# Patient Record
Sex: MALE | Race: OTHER | NOT HISPANIC OR LATINO | URBAN - METROPOLITAN AREA
[De-identification: names, ages, dates, MRNs, and addresses within clinical notes are randomized per-mention and may not be internally consistent; named-entity substitution may affect disease eponyms.]

---
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
## 2022-12-09 PROBLEM — Z00.00 ENCOUNTER FOR PREVENTIVE HEALTH EXAMINATION: Status: ACTIVE | Noted: 2022-12-09

## 2022-12-22 ENCOUNTER — TRANSCRIPTION ENCOUNTER (OUTPATIENT)
Age: 22
End: 2022-12-22

## 2022-12-22 ENCOUNTER — APPOINTMENT (OUTPATIENT)
Dept: OTOLARYNGOLOGY | Facility: CLINIC | Age: 22
End: 2022-12-22

## 2022-12-22 PROCEDURE — 92610 EVALUATE SWALLOWING FUNCTION: CPT | Mod: GN

## 2022-12-22 PROCEDURE — 92522 EVALUATE SPEECH PRODUCTION: CPT | Mod: GN

## 2023-01-03 ENCOUNTER — APPOINTMENT (OUTPATIENT)
Dept: OTOLARYNGOLOGY | Facility: CLINIC | Age: 23
End: 2023-01-03

## 2023-05-09 NOTE — REASON FOR VISIT
[FreeTextEntry1] : EVALUATION OF SPEECH & CHEWING FUNCTION  \par \par Reason for Referral: Mel Shultz is a 22year old male who was referred to this Center by his physician for a preoperative evaluation of speech and chewing function. 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  \par \par Presenting Problem: Mr. Shultz arrived to today’s evaluation accompanied by his mother who assisted in providing information relevant to the patient’s overall health, swallow and communicative function. In regard to his swallow function, the patient reports difficulty with mastication marked by a right sided preferential chewing pattern. He notes difficulty with oral clearance post swallow. He reports fatigue with prolonged mastication required for consumption of dense textures (I.e. bagels, steak). He denies pharyngeal s/s of dysphagia. He denies odynophagia and/or recent/recurrent pneumonia. In regard to his communicative function, the patient endorses “lisping” as well as fatigue with prolonged speaking. He denies language concerns.  \par \par Medical History: As per patient report, his medical history is unremarkable. \par \par Oral-Facial Examination: Oral-Facial examination revealed a facial symmetry (left to right) to be unremarkable. At rest position, the patient was observed to utilize an open mouth posture with a class III malocclusion noted. Intra-oral assessment revealed mildly reduced oral aperture and reduced, though functional labio-lingual strength and ROM. Orthodontic hardware was observed on upper and lower dentition. Upper airway breath sounds were clear at baseline. The oral cavity was adequately hydrated without any evidence of pooled secretions.  \par \par Speech: During conversational discourse, as well as during subtests of th GFTA the patient demonstrated articulation deficits atypical for his age marked by distortions of the following phonemes: /s/ /z/ /ch/ /sh/ and /d3/ in all positions of word, phrase ad conversational discourse levels. He is further noted to demonstrate atypical distortions of s-blend consonant clusters, again atypical for his age/gender. Diadochokinetic tasks were timely and complete. There was no evidence of verbal apraxia and/or dysfluency noted throughout assessment.  \par \par Chewing Function: The patient demonstrated moderate oral dysphagia with regular/solid textures marked by decreased mastication abilities resembling an open mouth, immature munching pattern with absent transition into a rotary chew pattern. Delayed oral preparation and anterior-posterior transport of dense bolus textures were further observed due to the abovementioned deficits with mastication. When the patient was prompted to utilize a closed mouth chewing posture as well as verbal cues to lateralize bolus, a mild improvement in overall oral management was observed. There was no evidence of anterior loss of dense textures observed during today’s assessment or reported by patient. The patient was presented with thin liquid textures at which time there was evidence of lingual thrust/protrusion observed. Pharyngeal stage of deglutition with regular/solid and liquid textures was noted to be within functional limits as marked by timely initiation of pharyngeal swallow trigger and timely and complete hyolaryngeal strength and excursion. There were no overt s/s of penetration/aspiration noted following any PO trials provided during today’s evaluation.  \par \par IMPRESSIONS:  \par 1) Dysarthria  \par 2) Mild Oral Dysphagia		 \par \par PROGNOSIS: Good with therapeutic intervention. \par \par RECOMMENDATIONS: \par 1)	The patient was advised to consume regular/solid diet with thin liquids.  \par 2)	The patient was provided with compensatory strategies designed to aid in chewing and articulatory functioning \par 3)	Consider short course of postoperative speech-swallow therapy, pending MD clearance.  \par 4)	The patient was advised to follow up with his primary care physician as directed. \par 5)	The patient was advised to follow up with his referring physician as directed. \par \par EDUCATION: The patient was provided with verbal and written material targeting compensatory strategies designed to aid in speech and chewing functioning. \par \par There were no further recommendations made at this time. Please contact this Center at [phone] if additional information is needed.

## 2023-10-12 ENCOUNTER — OUTPATIENT (OUTPATIENT)
Dept: OUTPATIENT SERVICES | Facility: HOSPITAL | Age: 23
LOS: 1 days | End: 2023-10-12
Payer: COMMERCIAL

## 2023-10-12 ENCOUNTER — APPOINTMENT (OUTPATIENT)
Dept: ENDOCRINOLOGY | Facility: CLINIC | Age: 23
End: 2023-10-12

## 2023-10-12 VITALS
RESPIRATION RATE: 16 BRPM | OXYGEN SATURATION: 100 % | DIASTOLIC BLOOD PRESSURE: 71 MMHG | WEIGHT: 169 LBS | HEIGHT: 71 IN | SYSTOLIC BLOOD PRESSURE: 122 MMHG | HEART RATE: 79 BPM | BODY MASS INDEX: 23.66 KG/M2

## 2023-10-12 DIAGNOSIS — Z00.00 ENCOUNTER FOR GENERAL ADULT MEDICAL EXAMINATION WITHOUT ABNORMAL FINDINGS: ICD-10-CM

## 2023-10-12 DIAGNOSIS — Z78.9 OTHER SPECIFIED HEALTH STATUS: ICD-10-CM

## 2023-10-12 DIAGNOSIS — N62 HYPERTROPHY OF BREAST: ICD-10-CM

## 2023-10-12 PROCEDURE — 99204 OFFICE O/P NEW MOD 45 MIN: CPT

## 2023-10-26 ENCOUNTER — APPOINTMENT (OUTPATIENT)
Dept: ENDOCRINOLOGY | Facility: CLINIC | Age: 23
End: 2023-10-26

## 2023-11-21 ENCOUNTER — OUTPATIENT (OUTPATIENT)
Dept: OUTPATIENT SERVICES | Facility: HOSPITAL | Age: 23
LOS: 1 days | End: 2023-11-21
Payer: COMMERCIAL

## 2023-11-21 DIAGNOSIS — N62 HYPERTROPHY OF BREAST: ICD-10-CM

## 2023-11-21 LAB — TSH SERPL-ACNC: 2.94 UIU/ML

## 2023-11-21 PROCEDURE — 84410 TESTOSTERONE BIOAVAILABLE: CPT

## 2023-11-21 PROCEDURE — 84153 ASSAY OF PSA TOTAL: CPT

## 2023-11-21 PROCEDURE — 84154 ASSAY OF PSA FREE: CPT

## 2023-11-21 PROCEDURE — 84402 ASSAY OF FREE TESTOSTERONE: CPT

## 2023-11-21 PROCEDURE — 80327 ANABOLIC STEROID 1 OR 2: CPT

## 2023-11-21 PROCEDURE — 84270 ASSAY OF SEX HORMONE GLOBUL: CPT

## 2023-11-21 PROCEDURE — 82627 DEHYDROEPIANDROSTERONE: CPT

## 2023-11-21 PROCEDURE — 84146 ASSAY OF PROLACTIN: CPT

## 2023-11-21 PROCEDURE — 36415 COLL VENOUS BLD VENIPUNCTURE: CPT

## 2023-11-21 PROCEDURE — 84140 ASSAY OF PREGNENOLONE: CPT

## 2023-11-21 PROCEDURE — 83001 ASSAY OF GONADOTROPIN (FSH): CPT

## 2023-11-21 PROCEDURE — 83002 ASSAY OF GONADOTROPIN (LH): CPT

## 2023-11-21 PROCEDURE — 84144 ASSAY OF PROGESTERONE: CPT

## 2023-11-21 PROCEDURE — 84443 ASSAY THYROID STIM HORMONE: CPT

## 2023-11-21 PROCEDURE — 84403 ASSAY OF TOTAL TESTOSTERONE: CPT

## 2023-11-21 PROCEDURE — 82533 TOTAL CORTISOL: CPT

## 2023-11-21 PROCEDURE — 82670 ASSAY OF TOTAL ESTRADIOL: CPT

## 2023-11-22 DIAGNOSIS — N62 HYPERTROPHY OF BREAST: ICD-10-CM

## 2023-11-23 LAB
CORTIS SERPL-MCNC: 6.6 UG/DL
DHEA-S SERPL-MCNC: 283 UG/DL
ESTRADIOL SERPL-MCNC: 31 PG/ML
ESTRADIOL SERPL-MCNC: 33 PG/ML
FSH SERPL-MCNC: 2.3 IU/L
LH SERPL-ACNC: 5.6 IU/L
PROGEST SERPL-MCNC: 0.4 NG/ML
PROLACTIN SERPL-MCNC: 11.4 NG/ML
PROLACTIN SERPL-MCNC: 11.6 NG/ML
PSA FREE FLD-MCNC: 47 %
PSA FREE SERPL-MCNC: 0.21 NG/ML
PSA SERPL-MCNC: 0.45 NG/ML
SHBG SERPL-SCNC: 29 NMOL/L

## 2023-11-25 LAB
TESTOST FREE SERPL-MCNC: 13.6 PG/ML
TESTOST SERPL-MCNC: 591 NG/DL
TESTOSTERONE FREE/WEAKLY BND: 181.3 NG/DL
TESTOSTERONE TOTAL S: 570 NG/DL
TESTOSTERONE, % FREE/WEAKLY BND: 31.8 %

## 2023-11-30 LAB
ANDROSTANOLONE SERPL-MCNC: 54 NG/DL
PREGNENOLONE-ESOTERIX: <10 NG/DL

## 2023-12-27 ENCOUNTER — EMERGENCY (EMERGENCY)
Facility: HOSPITAL | Age: 23
LOS: 0 days | Discharge: HOME | End: 2023-12-28
Attending: EMERGENCY MEDICINE
Payer: COMMERCIAL

## 2023-12-27 VITALS
TEMPERATURE: 97 F | OXYGEN SATURATION: 99 % | HEART RATE: 82 BPM | RESPIRATION RATE: 18 BRPM | DIASTOLIC BLOOD PRESSURE: 74 MMHG | SYSTOLIC BLOOD PRESSURE: 117 MMHG | HEIGHT: 71 IN | WEIGHT: 179.9 LBS

## 2023-12-27 DIAGNOSIS — R45.851 SUICIDAL IDEATIONS: ICD-10-CM

## 2023-12-27 DIAGNOSIS — F45.22 BODY DYSMORPHIC DISORDER: ICD-10-CM

## 2023-12-27 DIAGNOSIS — R00.1 BRADYCARDIA, UNSPECIFIED: ICD-10-CM

## 2023-12-27 DIAGNOSIS — F43.23 ADJUSTMENT DISORDER WITH MIXED ANXIETY AND DEPRESSED MOOD: ICD-10-CM

## 2023-12-27 DIAGNOSIS — Z56.0 UNEMPLOYMENT, UNSPECIFIED: ICD-10-CM

## 2023-12-27 DIAGNOSIS — Z20.822 CONTACT WITH AND (SUSPECTED) EXPOSURE TO COVID-19: ICD-10-CM

## 2023-12-27 LAB
ALBUMIN SERPL ELPH-MCNC: 4.4 G/DL — SIGNIFICANT CHANGE UP (ref 3.5–5.2)
ALBUMIN SERPL ELPH-MCNC: 4.4 G/DL — SIGNIFICANT CHANGE UP (ref 3.5–5.2)
ALP SERPL-CCNC: 79 U/L — SIGNIFICANT CHANGE UP (ref 30–115)
ALP SERPL-CCNC: 79 U/L — SIGNIFICANT CHANGE UP (ref 30–115)
ALT FLD-CCNC: 44 U/L — HIGH (ref 0–41)
ALT FLD-CCNC: 44 U/L — HIGH (ref 0–41)
ANION GAP SERPL CALC-SCNC: 10 MMOL/L — SIGNIFICANT CHANGE UP (ref 7–14)
ANION GAP SERPL CALC-SCNC: 10 MMOL/L — SIGNIFICANT CHANGE UP (ref 7–14)
APAP SERPL-MCNC: <5 UG/ML — LOW (ref 10–30)
APAP SERPL-MCNC: <5 UG/ML — LOW (ref 10–30)
AST SERPL-CCNC: 23 U/L — SIGNIFICANT CHANGE UP (ref 0–41)
AST SERPL-CCNC: 23 U/L — SIGNIFICANT CHANGE UP (ref 0–41)
BASOPHILS # BLD AUTO: 0.02 K/UL — SIGNIFICANT CHANGE UP (ref 0–0.2)
BASOPHILS # BLD AUTO: 0.02 K/UL — SIGNIFICANT CHANGE UP (ref 0–0.2)
BASOPHILS NFR BLD AUTO: 0.3 % — SIGNIFICANT CHANGE UP (ref 0–1)
BASOPHILS NFR BLD AUTO: 0.3 % — SIGNIFICANT CHANGE UP (ref 0–1)
BILIRUB SERPL-MCNC: 0.6 MG/DL — SIGNIFICANT CHANGE UP (ref 0.2–1.2)
BILIRUB SERPL-MCNC: 0.6 MG/DL — SIGNIFICANT CHANGE UP (ref 0.2–1.2)
BUN SERPL-MCNC: 15 MG/DL — SIGNIFICANT CHANGE UP (ref 10–20)
BUN SERPL-MCNC: 15 MG/DL — SIGNIFICANT CHANGE UP (ref 10–20)
CALCIUM SERPL-MCNC: 9.2 MG/DL — SIGNIFICANT CHANGE UP (ref 8.4–10.5)
CALCIUM SERPL-MCNC: 9.2 MG/DL — SIGNIFICANT CHANGE UP (ref 8.4–10.5)
CHLORIDE SERPL-SCNC: 102 MMOL/L — SIGNIFICANT CHANGE UP (ref 98–110)
CHLORIDE SERPL-SCNC: 102 MMOL/L — SIGNIFICANT CHANGE UP (ref 98–110)
CO2 SERPL-SCNC: 25 MMOL/L — SIGNIFICANT CHANGE UP (ref 17–32)
CO2 SERPL-SCNC: 25 MMOL/L — SIGNIFICANT CHANGE UP (ref 17–32)
CREAT SERPL-MCNC: 1 MG/DL — SIGNIFICANT CHANGE UP (ref 0.7–1.5)
CREAT SERPL-MCNC: 1 MG/DL — SIGNIFICANT CHANGE UP (ref 0.7–1.5)
EGFR: 110 ML/MIN/1.73M2 — SIGNIFICANT CHANGE UP
EGFR: 110 ML/MIN/1.73M2 — SIGNIFICANT CHANGE UP
EOSINOPHIL # BLD AUTO: 0.08 K/UL — SIGNIFICANT CHANGE UP (ref 0–0.7)
EOSINOPHIL # BLD AUTO: 0.08 K/UL — SIGNIFICANT CHANGE UP (ref 0–0.7)
EOSINOPHIL NFR BLD AUTO: 1.2 % — SIGNIFICANT CHANGE UP (ref 0–8)
EOSINOPHIL NFR BLD AUTO: 1.2 % — SIGNIFICANT CHANGE UP (ref 0–8)
ETHANOL SERPL-MCNC: <10 MG/DL — SIGNIFICANT CHANGE UP
ETHANOL SERPL-MCNC: <10 MG/DL — SIGNIFICANT CHANGE UP
FLUAV AG NPH QL: SIGNIFICANT CHANGE UP
FLUAV AG NPH QL: SIGNIFICANT CHANGE UP
FLUBV AG NPH QL: SIGNIFICANT CHANGE UP
FLUBV AG NPH QL: SIGNIFICANT CHANGE UP
GLUCOSE SERPL-MCNC: 92 MG/DL — SIGNIFICANT CHANGE UP (ref 70–99)
GLUCOSE SERPL-MCNC: 92 MG/DL — SIGNIFICANT CHANGE UP (ref 70–99)
HCT VFR BLD CALC: 48 % — SIGNIFICANT CHANGE UP (ref 42–52)
HCT VFR BLD CALC: 48 % — SIGNIFICANT CHANGE UP (ref 42–52)
HGB BLD-MCNC: 16.9 G/DL — SIGNIFICANT CHANGE UP (ref 14–18)
HGB BLD-MCNC: 16.9 G/DL — SIGNIFICANT CHANGE UP (ref 14–18)
IMM GRANULOCYTES NFR BLD AUTO: 0.3 % — SIGNIFICANT CHANGE UP (ref 0.1–0.3)
IMM GRANULOCYTES NFR BLD AUTO: 0.3 % — SIGNIFICANT CHANGE UP (ref 0.1–0.3)
LYMPHOCYTES # BLD AUTO: 2.27 K/UL — SIGNIFICANT CHANGE UP (ref 1.2–3.4)
LYMPHOCYTES # BLD AUTO: 2.27 K/UL — SIGNIFICANT CHANGE UP (ref 1.2–3.4)
LYMPHOCYTES # BLD AUTO: 33.6 % — SIGNIFICANT CHANGE UP (ref 20.5–51.1)
LYMPHOCYTES # BLD AUTO: 33.6 % — SIGNIFICANT CHANGE UP (ref 20.5–51.1)
MCHC RBC-ENTMCNC: 28.7 PG — SIGNIFICANT CHANGE UP (ref 27–31)
MCHC RBC-ENTMCNC: 28.7 PG — SIGNIFICANT CHANGE UP (ref 27–31)
MCHC RBC-ENTMCNC: 35.2 G/DL — SIGNIFICANT CHANGE UP (ref 32–37)
MCHC RBC-ENTMCNC: 35.2 G/DL — SIGNIFICANT CHANGE UP (ref 32–37)
MCV RBC AUTO: 81.6 FL — SIGNIFICANT CHANGE UP (ref 80–94)
MCV RBC AUTO: 81.6 FL — SIGNIFICANT CHANGE UP (ref 80–94)
MONOCYTES # BLD AUTO: 0.59 K/UL — SIGNIFICANT CHANGE UP (ref 0.1–0.6)
MONOCYTES # BLD AUTO: 0.59 K/UL — SIGNIFICANT CHANGE UP (ref 0.1–0.6)
MONOCYTES NFR BLD AUTO: 8.7 % — SIGNIFICANT CHANGE UP (ref 1.7–9.3)
MONOCYTES NFR BLD AUTO: 8.7 % — SIGNIFICANT CHANGE UP (ref 1.7–9.3)
NEUTROPHILS # BLD AUTO: 3.77 K/UL — SIGNIFICANT CHANGE UP (ref 1.4–6.5)
NEUTROPHILS # BLD AUTO: 3.77 K/UL — SIGNIFICANT CHANGE UP (ref 1.4–6.5)
NEUTROPHILS NFR BLD AUTO: 55.9 % — SIGNIFICANT CHANGE UP (ref 42.2–75.2)
NEUTROPHILS NFR BLD AUTO: 55.9 % — SIGNIFICANT CHANGE UP (ref 42.2–75.2)
NRBC # BLD: 0 /100 WBCS — SIGNIFICANT CHANGE UP (ref 0–0)
NRBC # BLD: 0 /100 WBCS — SIGNIFICANT CHANGE UP (ref 0–0)
PLATELET # BLD AUTO: 220 K/UL — SIGNIFICANT CHANGE UP (ref 130–400)
PLATELET # BLD AUTO: 220 K/UL — SIGNIFICANT CHANGE UP (ref 130–400)
PMV BLD: 9 FL — SIGNIFICANT CHANGE UP (ref 7.4–10.4)
PMV BLD: 9 FL — SIGNIFICANT CHANGE UP (ref 7.4–10.4)
POTASSIUM SERPL-MCNC: 4 MMOL/L — SIGNIFICANT CHANGE UP (ref 3.5–5)
POTASSIUM SERPL-MCNC: 4 MMOL/L — SIGNIFICANT CHANGE UP (ref 3.5–5)
POTASSIUM SERPL-SCNC: 4 MMOL/L — SIGNIFICANT CHANGE UP (ref 3.5–5)
POTASSIUM SERPL-SCNC: 4 MMOL/L — SIGNIFICANT CHANGE UP (ref 3.5–5)
PROT SERPL-MCNC: 7 G/DL — SIGNIFICANT CHANGE UP (ref 6–8)
PROT SERPL-MCNC: 7 G/DL — SIGNIFICANT CHANGE UP (ref 6–8)
RBC # BLD: 5.88 M/UL — SIGNIFICANT CHANGE UP (ref 4.7–6.1)
RBC # BLD: 5.88 M/UL — SIGNIFICANT CHANGE UP (ref 4.7–6.1)
RBC # FLD: 11.9 % — SIGNIFICANT CHANGE UP (ref 11.5–14.5)
RBC # FLD: 11.9 % — SIGNIFICANT CHANGE UP (ref 11.5–14.5)
RSV RNA NPH QL NAA+NON-PROBE: SIGNIFICANT CHANGE UP
RSV RNA NPH QL NAA+NON-PROBE: SIGNIFICANT CHANGE UP
SALICYLATES SERPL-MCNC: <0.3 MG/DL — LOW (ref 4–30)
SALICYLATES SERPL-MCNC: <0.3 MG/DL — LOW (ref 4–30)
SARS-COV-2 RNA SPEC QL NAA+PROBE: SIGNIFICANT CHANGE UP
SARS-COV-2 RNA SPEC QL NAA+PROBE: SIGNIFICANT CHANGE UP
SODIUM SERPL-SCNC: 137 MMOL/L — SIGNIFICANT CHANGE UP (ref 135–146)
SODIUM SERPL-SCNC: 137 MMOL/L — SIGNIFICANT CHANGE UP (ref 135–146)
WBC # BLD: 6.75 K/UL — SIGNIFICANT CHANGE UP (ref 4.8–10.8)
WBC # BLD: 6.75 K/UL — SIGNIFICANT CHANGE UP (ref 4.8–10.8)
WBC # FLD AUTO: 6.75 K/UL — SIGNIFICANT CHANGE UP (ref 4.8–10.8)
WBC # FLD AUTO: 6.75 K/UL — SIGNIFICANT CHANGE UP (ref 4.8–10.8)

## 2023-12-27 PROCEDURE — 93010 ELECTROCARDIOGRAM REPORT: CPT

## 2023-12-27 PROCEDURE — 85025 COMPLETE CBC W/AUTO DIFF WBC: CPT

## 2023-12-27 PROCEDURE — 99285 EMERGENCY DEPT VISIT HI MDM: CPT

## 2023-12-27 PROCEDURE — 90792 PSYCH DIAG EVAL W/MED SRVCS: CPT | Mod: 95,GC

## 2023-12-27 PROCEDURE — 0241U: CPT

## 2023-12-27 PROCEDURE — 99285 EMERGENCY DEPT VISIT HI MDM: CPT | Mod: 25

## 2023-12-27 PROCEDURE — 80307 DRUG TEST PRSMV CHEM ANLYZR: CPT

## 2023-12-27 PROCEDURE — 80053 COMPREHEN METABOLIC PANEL: CPT

## 2023-12-27 PROCEDURE — 93005 ELECTROCARDIOGRAM TRACING: CPT

## 2023-12-27 RX ORDER — HYDROXYZINE HCL 10 MG
1 TABLET ORAL
Qty: 14 | Refills: 0
Start: 2023-12-27 | End: 2024-01-02

## 2023-12-27 SDOH — ECONOMIC STABILITY - INCOME SECURITY: UNEMPLOYMENT, UNSPECIFIED: Z56.0

## 2023-12-27 NOTE — ED BEHAVIORAL HEALTH ASSESSMENT NOTE - DIFFERENTIAL
body dysmorphic disorder, social anxiety disorder, adjustment disorder with depressed/anxious mood mixed

## 2023-12-27 NOTE — ED PROVIDER NOTE - PHYSICAL EXAMINATION
Physical Exam    Constitutional: No acute distress.   Eyes: Conjunctiva pink, Sclera clear, PERRLA, EOMI.  ENT: No sinus tenderness. No nasal discharge. No oropharyngeal erythema, edema, or exudates. Uvula midline.   Cardiovascular: Regular rate, regular rhythm. No noted murmurs rubs or gallops.  Respiratory: unlabored respiratory effort, clear to auscultation bilaterally no wheezing, rales or rhonchi  Gastrointestinal: Normal bowel sounds. soft, non distended, non-tender abdomen.   Musculoskeletal: supple neck, no midline tenderness. No joint or bony deformity.   Integumentary: warm, dry, no rash  Neurologic: awake, alert, cranial nerves II-XII grossly intact, extremities’ motor and sensory functions grossly intact  Psychiatric: appropriate mood, appropriate affect. +SI -HI

## 2023-12-27 NOTE — ED PROVIDER NOTE - NSFOLLOWUPCLINICS_GEN_ALL_ED_FT
[facility]  [facility]  [address] [place] [id number]  Phone: [phone]  Fax:      [facility]  [facility]  [address] [place] [id number]  Phone: [phone]  Fax:

## 2023-12-27 NOTE — ED ADULT NURSE NOTE - NSFALLUNIVINTERV_ED_ALL_ED
Bed/Stretcher in lowest position, wheels locked, appropriate side rails in place/Call bell, personal items and telephone in reach/Instruct patient to call for assistance before getting out of bed/chair/stretcher/Non-slip footwear applied when patient is off stretcher/[place] to call system/Physically safe environment - no spills, clutter or unnecessary equipment/Purposeful proactive rounding/Room/bathroom lighting operational, light cord in reach Bed/Stretcher in lowest position, wheels locked, appropriate side rails in place/Call bell, personal items and telephone in reach/Instruct patient to call for assistance before getting out of bed/chair/stretcher/Non-slip footwear applied when patient is off stretcher/[place] to call system/Physically safe environment - no spills, clutter or unnecessary equipment/Purposeful proactive rounding/Room/bathroom lighting operational, light cord in reach

## 2023-12-27 NOTE — ED BEHAVIORAL HEALTH ASSESSMENT NOTE - HPI (INCLUDE ILLNESS QUALITY, SEVERITY, DURATION, TIMING, CONTEXT, MODIFYING FACTORS, ASSOCIATED SIGNS AND SYMPTOMS)
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  PMHx of an underbite s/p orthognathic surgery 4 months ago.  No formal past psychiatric history, no past inpatient or outpatient psychiatric treatment but has been engaged in outpatient psychotherapy since Oct 2023; no past IP psych admissions, no past suicide attempts, no past hx of violence or arrests, no hx of substance use.  Patient was BIB mother for panic attacks and SI related to dissatisfaction with a "botched jaw surgery."             Patient is calm and cooperative.  Affect is anxious, constricted range.  appropriate speech and behavior.  Not reacting to internal stimuli.             Patient reports that 4 months ago he had surgery to correct an underbite; he reports that it was medically necessary but he also wanted it don't for cosmetic reasons as he was very self-conscious about his underbite.  He reports that he's had chronic social anxiety and has always worried about his appearance, however over the past year it's been mostly related to new-onset hair loss and the aforementioned jaw issues.  Patient reports that after the surgery he has been consumed with thoughts that his jaw now protrudes too far forward and he thinks about this all day which is now causing anxiety and panic attacks over the past week.  He spends a significant amount of time researching his jaw surgery, the angles of his jaw, reading online forums about correcting one's jaw, etc.  He spends significant time looking in the mirror, taking pictures of his face and ruminating about his appearance.  He reports insomnia over the past week due to the anxiety, has been eating less and doesn't want to leave the house.  He admits to low mood over the past week and is feeling hopeless.  Denies worthlessness, guilt.  Admits to thoughts of "wanting to disappear" or "wanting to be someone else."  Denies any wishes to be dead.  Denies thoughts of wanting to kill self, thinking of ways to kill self, or having any timeline for suicide.  He denies any past suicide attempts or doing anything recently to plan for one.  Denies NSSIB.  Patient reports a strong fear of death and that he would never kill himself because he wouldn't want to hurt his family. He reports a strong dedication to his future, school, and life in general.       Patient denies any manic or psychotic symptoms, denies HI.  Though he is endorsing anhedonia, he is still taking care of himself, showering, brushing his teeth, eating, changing his clothes.  he does admit to thinking that he won't return to school next semester however due to his anxiety.       Patient denies any hx of substance use.  Denies hx of trauma.    he feels very supported by his family and by his therapists who have all been helpful during this time.       Spoke with mother who corroborates on the patient's history above.  She states that he takes a lot of pictures of himself and looks at them, stares at his face in the mirror, and has been having anxiety attacks recently because of his dissatisfaction with his appearance, mostly related to his jaw, but also related to his reported hair loss.  Mother states that he doesn't appear to be losing any hair, but when he is told this, he doesn't believe anyone.  Similarly, when everyone tells him that his jaw looks fine, he doesn't believe them and he persists in stating that his jaw looks like it's protruding too far forward.  Mother denies any safety concerns with the patient returning home and she denies any concern that he would actually do anything to hurt himself or kill himself.  She is in agreement that he should see a psychiatrist for treatment of anxiety and mood issues, as it is impacting his daily function (insomnia, anhedonia, loss of appetite) however she denies any overt loss of function or inability for the patient to care for himself.,  Mother confirms that he has a lot of support at home and with his 2 therapists.  She denies patient ever attempting suicide or having a hx of NSSIB.  She denies patient using substances. 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  PMHx of an underbite s/p orthognathic surgery 4 months ago.  No formal past psychiatric history, no past inpatient or outpatient psychiatric treatment but has been engaged in outpatient psychotherapy since Oct 2023; no past IP psych admissions, no past suicide attempts, no past hx of violence or arrests, no hx of substance use.  Patient was BIB mother for panic attacks and SI related to dissatisfaction with a "botched jaw surgery."             Patient is calm and cooperative.  Affect is anxious, constricted range.  appropriate speech and behavior.  Not reacting to internal stimuli.             Patient reports that 4 months ago he had surgery to correct an underbite; he reports that it was medically necessary but he also wanted it don't for cosmetic reasons as he was very self-conscious about his underbite.  He reports that he's had chronic social anxiety and has always worried about his appearance, however over the past year it's been mostly related to new-onset hair loss and the aforementioned jaw issues.  Patient reports that after the surgery he has been consumed with thoughts that his jaw now protrudes too far forward and he thinks about this all day which is now causing anxiety and panic attacks over the past week.  He spends a significant amount of time researching his jaw surgery, the angles of his jaw, reading online forums about correcting one's jaw, etc.  He spends significant time looking in the mirror, taking pictures of his face and ruminating about his appearance.  He reports insomnia over the past week due to the anxiety, has been eating less and doesn't want to leave the house.  He admits to low mood over the past week and is feeling hopeless.  Denies worthlessness, guilt.  Admits to thoughts of "wanting to disappear" or "wanting to be someone else."  Denies any wishes to be dead.  Denies thoughts of wanting to kill self, thinking of ways to kill self, or having any timeline for suicide.  He denies any past suicide attempts or doing anything recently to plan for one.  Denies NSSIB.  Patient reports a strong fear of death and that he would never kill himself because he wouldn't want to hurt his family. He reports a strong dedication to his future, school, and life in general.       Patient denies any manic or psychotic symptoms, denies HI.  Though he is endorsing anhedonia, he is still taking care of himself, showering, brushing his teeth, eating, changing his clothes.  he does admit to thinking that he won't return to school next semester however due to his anxiety.       Patient denies any hx of substance use.  Denies hx of trauma.    he feels very supported by his family and by his therapists who have all been helpful during this time.       Spoke with mother who corroborates on the patient's history above.  She states that he takes a lot of pictures of himself and looks at them, stares at his face in the mirror, and has been having anxiety attacks recently because of his dissatisfaction with his appearance, mostly related to his jaw, but also related to his reported hair loss.  Mother states that he doesn't appear to be losing any hair, but when he is told this, he doesn't believe anyone.  Similarly, when everyone tells him that his jaw looks fine, he doesn't believe them and he persists in stating that his jaw looks like it's protruding too far forward.  Mother denies any safety concerns with the patient returning home and she denies any concern that he would actually do anything to hurt himself or kill himself.  She is in agreement that he should see a psychiatrist for treatment of anxiety and mood issues, as it is impacting his daily function (insomnia, anhedonia, loss of appetite) however she denies any overt loss of function or inability for the patient to care for himself.,  Mother confirms that he has a lot of support at home and with his 2 therapists.  She denies patient ever attempting suicide or having a hx of NSSIB.  She denies patient using substances. 22 yo single male, domiciled with parents and siblings in private residence, enrolled in [employer] science program at [employer], but currently on a leave of absence, not employed, has no dependents, PMHx significant for a self-reported underbite s/p orthognathic surgery 4 months ago, with no formal psychiatric history, no prior psych admissions, no prior suicide attempts, no past hx of violence or arrests, no hx of substance use, has been engaged in outpatient psychotherapy since Oct 2023, not on meds, who was BIB mother at pt's request for panic attacks and SI related to dissatisfaction with his reported "botched jaw surgery."       Patient is calm and cooperative.  Affect is anxious, constricted range.  appropriate speech and behavior.  Not reacting to internal stimuli.       Patient reports that 4 months ago he had surgery to correct an underbite; he reports that it was medically necessary but he also wanted it done for cosmetic reasons as he was very self-conscious about his underbite.  He reports that he's had chronic social anxiety and has always worried about his appearance, however over the past year it's been mainly related to his perceived hair loss and the aforementioned jaw issues. Patient reports that after the surgery he has been preoccupied with thoughts that his jaw now protrudes farther forward and he thinks about this all day, which is now leading to panic attacks over the past week with difficulty sleeping.  He reports spending a significant amount of time researching his jaw surgery, the angles of his jaw, reading online forums about correcting one's jaw, etc.  He also spends a significant amount of time looking in the mirror, taking pictures of his face, and ruminating about his appearance.  He reports insomnia over the past week due to the anxiety, states he has been eating less, doesn't want to leave the house, and has been feeling low and hopeless for the last week. He admits to thoughts of "wanting to disappear" and "wanting to be someone else.", but consistently denies current or recent active SI with plan or intent or having any timeline for suicide.  He denies any past suicide attempts or doing anything recently to plan for one. Patient reports a strong fear of death and that he would never kill himself because he wouldn't want to hurt his family.        Patient denies any manic or psychotic symptoms, HI, substance misuse, or trauma and reports feeling supported by his family and therapists. He does, however, express an interest in being connected to a clinic that offers both psychotherapy and medication management given his interest in starting medication to target his mood and anxiety symptoms. He states he feels safe returning home if cleared and declined psychiatric hospitalization when offered.     Spoke with mother with pt's consent who corroborates the history obtained by the patient.  She states that he takes a lot of pictures of himself and looks at them, stares at his face in the mirror, and has been having anxiety attacks recently because of his dissatisfaction with his appearance, mostly related to his jaw, but also related to his reported hair loss. Mother states that he doesn't appear to be losing any hair, but when he is told this, he doesn't believe anyone.  Similarly, when everyone tells him that his jaw looks fine, he doesn't believe them and he persists in stating that his jaw looks like it's protruding too far forward.  Mother denies any safety concerns with the patient returning home and she denies any concern that he would actually do anything to hurt himself or kill himself.  She is in agreement that he should see a psychiatrist for treatment of anxiety and mood issues, as it is impacting his daily function (insomnia, anhedonia, loss of appetite) however she denies any overt loss of function or inability for the patient to care for himself.,  Mother confirms that he has a lot of support at home and with his 2 therapists.  She denies patient ever attempting suicide or having a hx of NSSIB.  She denies patient using substances. Mother confirmed she will secure all potentially dangerous items at home to prevent pt from readily accessing them(ie sharps, pills, etc). 24 yo single male, domiciled with parents and siblings in private residence, enrolled in [employer] science program at [employer], but currently on a leave of absence, not employed, has no dependents, PMHx significant for a self-reported underbite s/p orthognathic surgery 4 months ago, with no formal psychiatric history, no prior psych admissions, no prior suicide attempts, no past hx of violence or arrests, no hx of substance use, has been engaged in outpatient psychotherapy since Oct 2023, not on meds, who was BIB mother at pt's request for panic attacks and SI related to dissatisfaction with his reported "botched jaw surgery."       Patient is calm and cooperative.  Affect is anxious, constricted range.  appropriate speech and behavior.  Not reacting to internal stimuli.       Patient reports that 4 months ago he had surgery to correct an underbite; he reports that it was medically necessary but he also wanted it done for cosmetic reasons as he was very self-conscious about his underbite.  He reports that he's had chronic social anxiety and has always worried about his appearance, however over the past year it's been mainly related to his perceived hair loss and the aforementioned jaw issues. Patient reports that after the surgery he has been preoccupied with thoughts that his jaw now protrudes farther forward and he thinks about this all day, which is now leading to panic attacks over the past week with difficulty sleeping.  He reports spending a significant amount of time researching his jaw surgery, the angles of his jaw, reading online forums about correcting one's jaw, etc.  He also spends a significant amount of time looking in the mirror, taking pictures of his face, and ruminating about his appearance.  He reports insomnia over the past week due to the anxiety, states he has been eating less, doesn't want to leave the house, and has been feeling low and hopeless for the last week. He admits to thoughts of "wanting to disappear" and "wanting to be someone else.", but consistently denies current or recent active SI with plan or intent or having any timeline for suicide.  He denies any past suicide attempts or doing anything recently to plan for one. Patient reports a strong fear of death and that he would never kill himself because he wouldn't want to hurt his family.        Patient denies any manic or psychotic symptoms, HI, substance misuse, or trauma and reports feeling supported by his family and therapists. He does, however, express an interest in being connected to a clinic that offers both psychotherapy and medication management given his interest in starting medication to target his mood and anxiety symptoms. He states he feels safe returning home if cleared and declined psychiatric hospitalization when offered.     Spoke with mother with pt's consent who corroborates the history obtained by the patient.  She states that he takes a lot of pictures of himself and looks at them, stares at his face in the mirror, and has been having anxiety attacks recently because of his dissatisfaction with his appearance, mostly related to his jaw, but also related to his reported hair loss. Mother states that he doesn't appear to be losing any hair, but when he is told this, he doesn't believe anyone.  Similarly, when everyone tells him that his jaw looks fine, he doesn't believe them and he persists in stating that his jaw looks like it's protruding too far forward.  Mother denies any safety concerns with the patient returning home and she denies any concern that he would actually do anything to hurt himself or kill himself.  She is in agreement that he should see a psychiatrist for treatment of anxiety and mood issues, as it is impacting his daily function (insomnia, anhedonia, loss of appetite) however she denies any overt loss of function or inability for the patient to care for himself.,  Mother confirms that he has a lot of support at home and with his 2 therapists.  She denies patient ever attempting suicide or having a hx of NSSIB.  She denies patient using substances. Mother confirmed she will secure all potentially dangerous items at home to prevent pt from readily accessing them(ie sharps, pills, etc).

## 2023-12-27 NOTE — ED ADULT NURSE NOTE - CHIEF COMPLAINT QUOTE
Patient states "I need to see a psychiatrist, I had jaw surgery 4 months ago that was botched. I've been having panic attacks. Also I have been thinking about killing myself". Denies Homicidal ideations, denies plan for SI

## 2023-12-27 NOTE — ED PROVIDER NOTE - ATTENDING APP SHARED VISIT CONTRIBUTION OF CARE
23yM p/w obsessive behavior and suicidal ideation.  No hallucinations or homicidal ideation.  No prior psych hx.

## 2023-12-27 NOTE — ED BEHAVIORAL HEALTH ASSESSMENT NOTE - NSBHATTESTCOMMENTATTENDFT_PSY_A_CORE
This writer reviewed the above assessment, made edits where appropriate, and agree with the plan and recs.

## 2023-12-27 NOTE — BH SAFETY PLAN - ASK FOR HELP NAME 1
Ankle Sprain (Adult)  An ankle sprain is a stretching or tearing of the ligaments that hold the ankle joint together. There are no broken bones.  An ankle sprain is a common injury for both children and adults. It happens when the ankle turns, twists, or rolls in an awkward way. This can be caused by a sports injury. Or it can happen from doing something as simple as stepping on an uneven surface.  Ligaments are made of tough connective tissue. Normally, ligaments stretch a certain amount and then go back to their normal place. A sprain happens when a ligament is forced to stretch more than the normal amount. A severe sprain can actually tear the ligaments. If you have a severe sprain, you may have felt or heard something like a pop when you were injured.  Ankle sprains are given a grade depending on whether they are mild, moderate, or severe:  · Grade 1 sprain. A mild sprain with minor stretching and damage to the ligament.  · Grade 2 sprain. A moderate sprain where the ligament is partly torn.  · Grade 3 sprain. The most severe kind of sprain. The ligament is completely torn.  Most sprains take about 4 to 6 weeks to heal. A severe sprain can take several months to recover.  Your healthcare provider may order X-rays to be sure you don’t have a fracture, or broken bone.  The injured area will feel sore.  Swelling and pain may make it hard to walk. You may need crutches if walking is painful. Or your provider may have you use a cast boot or air splint. This will depend on the grade of ankle sprain that you have.    Home care  · For a Grade 1 sprain, use RICE (Rest, Ice, Compression, and Elevation):  · Rest your ankle. Don’t walk on it.  · Ice should be used right away to help control swelling. Place an ice pack over the injured area for 20 minutes. Do this every 3 to 6 hours for the first 24 to 48 hours. Keep using ice packs to ease pain and swelling as needed. To make an ice pack, put ice cubes in a plastic bag  that seals at the top. Wrap the bag in a clean, thin towel or cloth. Never put ice or an ice pack directly on the skin. The ice pack can be put right on the cast, bandage, or splint. As the ice melts, be careful that the cast, bandage, or splint doesn’t get wet. If you have a boot, open it to apply an ice pack, unless told otherwise by your provider.  · Compression devices help to control swelling. They also keep the ankle from moving and support your injured ankle. These devices include dressings, bandages, and wraps.  · Elevate or raise your ankle above the level of your heart when sitting or lying down. This is very important for the first 48 hours.  · Follow the RICE guidelines for a Grade 2 sprain. This type of sprain will take longer to heal. Your provider may have you wear a splint, cast, or brace to keep your ankle from moving.  ·  If you have a Grade 3 sprain, you are at risk for long-term ankle instability. In rare cases, surgery may be needed. Your provider may have you wear a short leg cast or a walking boot for 2 to 3 weeks.  · After 48 hours, it may be helpful to apply heat for 20 minutes several times a day. You can do this with a heating pad or warm compress. Or you may want to go back and forth between using ice and heat. Never apply heat directly to the skin. Always wrap the heating pad or warm compress in a clean, thin towel or cloth.  · You may use over-the-counter pain medicine (NSAIDS or nonsteroidal anti-inflammatory drugs) to control pain, unless another pain medicine was prescribed. Talk with your provider beforeusing these medicines if you have chronic liver or kidney disease, or have ever had a stomach ulcer or GI (gastrointestinal) bleeding.  · Follow any rehabilitation exercises your provider gives you. These can help you be more flexible and improve your balance and coordination. This is helpful in preventing long-term ankle problems.  Prevention  To help prevent ankle sprains, it’s  therapist Anneliese Ragsdale important to have good strength, balance, and flexibility. Be sure to:  · Always warm up before you exercise or do something very active  · Be careful when walking or running on uneven or cracked surfaces  · Wear shoes that are in good condition and fit well  · Listen to your body’s signals to slow down when you are in pain or tired  Follow-up care  Follow up with your healthcare provider, or as advised. Check for any warning signs listed below.  If your symptoms don’t improve or they get worse, talk with your provider. You may need an X-ray.  When to seek medical advice  Call your healthcare provider right away if any of these occur:  · Fever of 100.4 F (38 C) or higher, or as directed  · The injury doesn’t seem to be healing  · The swelling comes back  · The cast has a bad smell  · The plaster cast or splint gets wet or soft  · The fiberglass cast or splint gets wet and does not dry for 24 hours  · The pain or swelling increases, or redness appears  · Your toes become cold, blue, numb, or tingly  · The skin is discolored (looks blue, purple, or gray), has blisters, or is irritated  · You re-injure your ankle  © 7131-6939 [employer]. [address] [place] [address], [place] [address]. All rights reserved. This information is not intended as a substitute for professional medical care. Always follow your healthcare professional's instructions.            Aircast®  Traditional splints and casts for the foot and ankle protect the injury by preventing movement at the joints. However, many injuries heal better and faster if the injured joint can be moved, while protected at the same time. This is the reason for using an Aircast.  There are two common type of AirCasts:  1) Air-Stirrup® ankle splint  This is often used to treat ankle sprains. It contains padded air cells in a plastic frame that fits into your shoe. This allows you to walk while preventing the ankle joint from rolling in or out causing  re-injury.  Ankle sprains can take 4-6 weeks to heal. Persons with severe injuries or over age 60 may require more time to heal. During that time, you are prone to re-injury by suddenly twisting your ankle again while the ligaments are still weak.  When treating a sprain, the Air-Stirrup splint should be worn whenever walking for at least four weeks, or as long as you continue to have ankle pain. You should continue to wear it at least 6 weeks whenever running, playing sports or any activity where there is increased risk of re-injury. Talk to your doctor for specific advice about the treatment of your condition.  2) SP-Walker® boot  This is a short boot that provides support and protection to the foot and ankle while allowing you to walk. It contains padded air cells that provide compression and help circulation. It is used for both foot and ankle injuries - both sprains and minor fractures. Talk to your doctor for specific advice about the treatment of your condition.  Air-Stirrup® and SP-Walker® are trademarks of [employer].  For more information about their products, see www.aircast.com.  © 8557-3009 [employer]. [address] [place] [address], [place] [address]. All rights reserved. This information is not intended as a substitute for professional medical care. Always follow your healthcare professional's instructions.         my mom

## 2023-12-27 NOTE — ED BEHAVIORAL HEALTH NOTE - BEHAVIORAL HEALTH NOTE
==================  PRE-HOSPITAL COURSE  ===================  SOURCE:  RN and secondhand ED documentation  DETAILS: BIB self for SI and paranoia  =========  ED COURSE  =========  SOURCE:  RN and secondhand ED documentation.  ARRIVAL:  Per chart and RN, patient arrived via walk in. Per RN, patient was calm upon arrival, and cooperative with triage process.  BELONGINGS:  Per RN, patient arrived with belongings. All belongings were provided to hospital security, and patient currently in a gown with a 1:1 staff member.  BEHAVIOR: RN described patient to be calm and cooperative, presenting with linear thought process, AAOx3, presenting with normal mood and congruent affect, remains in behavioral and impulse control, is not violent/aggressive. RN stated that the patient is endorsing SI. RN stated that there are no visible marks, bruises, or lacerations on the body. RN stated that the patient appears to be well-groomed, maintains good hygiene.  TREATMENT:  Per chart and RN, patient did not receive PRN medications.   VISITORS:  Per RN, no visitors at bedside.         COVID Exposure Screen- collateral (i.e. third-party, chart review, belongings, etc; include EMS and ED staff)   ---------------------------------------------------  1. Has the patient had a COVID-19 test in the last 90 days? Unknown.   2. Has the patient tested positive for COVID-19 antibodies? Unknown.   3. Has the patient received 2 doses of the COVID-19 vaccine? Unknown  4. In the past 10 days, has the patient been around anyone with a positive COVID-19 test?* Unknown.   5. Has the patient been out of [place] within the past 10 days? Unknown

## 2023-12-27 NOTE — ED BEHAVIORAL HEALTH ASSESSMENT NOTE - PATIENT'S CHIEF COMPLAINT
Your Child's Health  15-Month-Old Visit      Jennifer Gonzales  October 19, 2023    Visit Vitals  Pulse 135   Temp 98.5 °F (36.9 °C)   Ht 30.08\" (76.4 cm)   Wt 9.707 kg (21 lb 6.4 oz)   HC 47 cm (18.5\")   BMI 16.63 kg/m²     Weight:     YOUR CHILD’S 15 MONTH-OLD VISIT      Key points at this age…  Jennifer should continue to ride in a properly-installed car seat in the back seat. Correct use of a car seat is always critically important for your baby’s safety. For maximum safety, keep the seat rear facing until your child reaches the top height or weight limit allowed by the car seat .        Lifelong nutritional habits begin now. Avoid starting unhealthy foods (fried fast food, sweets, chips, other bagged snacks) and sweetened drinks like juice and soda. If you do give juice, limit it to 4 ounces or less of 100% fruit juice daily.    Take care of that claudio smile! Brush twice daily with a tiny amount of regular (not baby) toothpaste. Avoid sugary and sticky foods as well as juice.       NUTRITION:    It is normal for a child’s appetite to go up and down quite a bit after one year of age. This is because the rate of growth is slowing down - it is normal! Let them work on their self-feeding skills (finger foods, using a spoon) even if they are messy and you don't think they are eating enough. Some days they will eat a lot, some days much less. The key is to offer them healthy food choices in small amounts. If they finish what you give them, they can have more. Most \"picky\" eaters still grow and gain weight normally! Overweight and obesity are serious health problems in our [place], and they often start in childhood. So don't push your child to eat more than they want and don’t give unhealthy foods (fried fast food, sweets, chips and other bagged snacks) to them. Right now you are in charge of what's going into them - keep it healthy!    Milk and water are the healthiest drink choices for your child.  The milk is important because it provides calcium for bone health; they should ideally get 16 to 20 ounces of milk each day.     Eating fruit is much healthier than drinking juice. Even \"natural\" juices have extra sugars that can lead to tooth decay and weight gain. Children between 1 and 3 years of age should have no more than 4 ounces of 100% fruit juice daily. Do not water it down in a bottle or sippy cup that they can carry around and drink from over an extended period of time; this frequent exposure to the sugars in juice (even if diluted with water) just increases their risk of tooth decay.      Keep avoiding hard, chunky or chewy foods that a child could choke on. Be careful when serving foods like fruits (especially grapes) and vegetables--make sure they are cut into small pieces that will not pose a choking risk.     Even when eating a very well balanced diet, children need some extra vitamin D. A liquid preparation of pure vitamin D (400 or 600 IU) or a multivitamin with iron drop is recommended.  (They are currently too young for a chewable vitamin because they could choke on those.)    Everything your child drinks at this age should be from a cup. If your child is not yet off the bottle, talk to your doctor about ways to work on this. Using the bottle at this age is only going to cause problems (nutritional, dental).      DEVELOPMENT:  Most children at this age will listen to a story and imitate what you do.  They may have just a few words (besides “mama” and “sondra”) but will let you know what they want by pulling you towards something, grunting and pointing. They understand when you tell them to do simple things (although they might not always do it!). They may scribble if you give them a crayon or marker. And they will start moving around faster and faster! Most will run and climb whenever they see an opportunity and really won’t be bothered by minor falls and bruises--watching a toddler requires  constant attention!    They may bring a book to you to read--encourage this because books are a great way to work on their language. (You don’t even have to read the story--just point to pictures, talk about the story and encourage your child to say words.) Avoid the temptation to put them in front of the TV or to put a cell phone or pad in their hands--the American Academy of Pediatrics does not recommend any “screen time” before age 2 years.      DISCIPLINE:  Parents and older family members need to agree on rules about how to respond to your toddler when he or she does something dangerous or undesirable (like hitting or biting - those behaviors will not be cute as they grow older). Consistency is important. If all family members react to the child's behaviors in the same way, the child will soon learn which behaviors are more likely to earn them praise and smiles (positive attention).     Keep it simple at this age. Saying a firm “no”, redirecting the child to a different toy or activity or briefly ignoring them (as long as they are not getting into harm’s way) will work better than yelling or long explanations about why you want them to not do something. Long lectures may actually reinforce the undesired behavior because you are paying attention to your child when you are lecturing them--remember that your attention is what your child wants most from you!     Temper tantrums may start in the near future. Toddlers get frustrated easily because they realize there are so many things they want to be doing, but they just are not capable (or permitted!) to do them. If your child starts having tantrums, leave them in a safe place (like the center of a room) and walk away or turn your back.  Don't say anything until they are done. Once they quiet down, don't lecture them (they don't get that!). Instead, start a new activity or grab a book - they need to focus on something new rather than dwell on what upset them. To  make life easier (and reduce the number of rules in your home), set up your home to be as safe as possible. Put away dangerous and valuable or fragile items. If you have fewer things that are off-limits to children, there are fewer opportunities for them to get into trouble.    DENTAL CARE:   Establish a regular brushing routine twice a day, ideally after breakfast and before bed. You should be brushing twice a day with a tiny smear (the size of a grain of rice!) of regular (fluoridated) toothpaste (not baby toothpaste).  Many toddlers want to do this themselves--they may be enthusiastic about it, but they are going to miss spots! It’s important for a parent to get in there every time and make sure all the teeth have been cleaned.     Fluoride is very important for your child’s dental health.  In addition to brushing with fluoridated toothpaste, they should be drinking the tap (city) water (which is carefully monitored so it has the ideal amount of fluoride for dental health). If you have well water instead of a city water supply, however, you should have it tested for fluoride content to determine whether your child might need fluoride supplements.     SAFETY/ACCIDENT PREVENTION:    Car Safety:  An approved car seat in the back seat is required by [place] law. Your child is safest in a rear-facing convertible car seat right now: keep the seat in a rear-facing position until your child reaches the top height or weight limit allowed by the car seat . (Even if your child is tall and they have to keep their legs bent, they are still safer when rear facing.)    Poisoning: Keep all cleaning and chemical products safely stored out of sight and out of reach. (Check for what is under your bathroom sink as well as your kitchen sink.) Keep purses (your own and ones belonging to visitors) out of reach of curious toddlers; they can quickly find medicines, cigarettes, and small objects to choke on!     Keep the  original bottles and safety caps for all medicines, including vitamins; do not transfer medicines to non-child-proofed or unlabeled containers. Be especially careful when around older people because they may keep their medicines out in the open or in non-childproofed containers.     Is this number in your cell phone? Call the Poison Center at 1-879.779.1188 for any known or suspected poisoning.       Falls: Your toddler will start moving around faster and faster! Be aware of what they can run into (furniture with sharp edges) and fall down, out or off of (stairs, windows, stepstools). Keep madera on stairs and window latches on upper-story windows.    Burns: A toddler’s independence puts them at risk for burns because they want to touch everything they see (pans on the stove or freshly out of the oven, any water faucet within reach, irons, curling irons). Keep your water heater at a maximum temperature of 120-125°F to prevent scald burns. Be very careful to keep hot items out of reach. Have a family fire safety plan, including regular smoke detector (and carbon monoxide detector) battery checks, working fire extinguishers, and escape routes.    Water Safety: Toddlers often love the water but they need to be very closely supervised around it (this means tubs, buckets, wading pools and toilets as well as pools and lakes!).Children can drown in just a couple inches of water, so never leave an infant or toddler alone in a tub. Also, do not rely on older children to properly supervise the younger ones. An adult should always be within arm’s reach whenever a young child is in or around water.     Most children are not developmentally ready for swimming lessons until ~4 years of age. Swim programs for younger children have not been proven to prevent drowning, nor will life jackets and “swimmies” protect your child from drowning! Constant supervision by an adult who knows how to swim is critical. Permanent pools (in ground  and above ground) should be fenced off (and locked), and wading pools should be drained when not in use. Keep large buckets empty and keep toilet seat lids down and secured with a safety lock if possible.     Smoke Exposure: Continue to protect your child from cigarette smoke. Exposure to smoke will increase their risk of asthma, ear infections, and respiratory infections (pneumonia). If you smoke and are ready to consider quitting, talk to your doctor. Nicotine replacement products can be very helpful in breaking this tough addiction.       SLEEP: Consistency is key to good sleep habits!  1.  Provide a consistent dinner time (ideally not immediately before bedtime).   2.  Provide a consistent bedtime--keep it the same for weeknights and weekdays.  3.  Avoid stimulating activity before bedtime, including scary or intense movies or TV shows and high-energy physical activity or play.   4. Provide a consistent and reassuring routine (bath, brushing teeth, song/story, sleep). Your child should always be put to bed in the same bed.  5. Your child should always be put to bed sleepy but awake.  6.  Provide a “transition” object.  This could be a pillow, favorite blanket, or stuffed animal. Don't give pacifiers or other items to suck on; also avoid items on strings and anything with small pieces that could be swallowed.  7.  Provide a consistent wake-up time (unless your child is ill and needs extra sleep).    8. Night waking may develop, even in children who have previously slept well. If your child wakes up at night, visit them briefly, but do not spend a lot of time or do a lot of talking or extended reassurance. Make sure they have their “transition” object so they can console themselves back to sleep.     SHOES:  Children need shoes to protect their feet when they start walking outside. They don’t need special arches or  fashions.  Buy shoes that have a roomy fit and flat, flexible soles with nonskid bottoms.  Inexpensive ones are okay--they outgrow them quickly!     SUN EXPOSURE:  Protect your child from direct sun as much as you can. Keep them in the shade as much as possible and use protective clothing (including hats and sunglasses) when you are out. Always use sunscreen when your child is going to be outside. Choose one labelled as “broad spectrum” (which means it protects against both UVA and UVB rays) and with an SPF of 15 to 30; apply it to your child’s skin at least 20 to 30 minutes before going out in the sun. Zinc oxide (or titanium dioxide) products are good for sensitive spots (nose, cheeks, ears, shoulders); these don’t “rub in” all the way, but kids often like the fun colors they come in!      MEDICATION FOR FEVER OR PAIN:   Acetaminophen liquid (e.g., Tylenol or Tempra) may be given every four hours as needed for pain or fever.  Be sure to check which product CONCENTRATION you are using.    INFANT Tylenol/Acetaminophen  Drops (160 mg/5 mL)    Child’s Weight: Dose:  18 - 23 pounds:   120 mg (3.75 mL (3/4 Teaspoon))  23 - 27 pounds:   160 mg (5.0 mL (1 Teaspoon))    CHILDREN’S Tylenol/Acetaminophen  (160 mg/5 mL)    Child’s Weight: Dose:  18 - 23 pounds:   120 mg (3.75 mL (3/4 Teaspoon))  23 - 27 pounds:   160 mg (5.0 mL (1 Teaspoon))    INFANT Ibuprofen (50 mg/1.25 ml) liquid (for example Advil or Motrin) may be given every 6 hours as needed for pain or fever.    Child’s Weight: Dose:  18 - 23 pounds:   75 mg (1.875 mL)    CHILDREN'S Ibuprofen (100 mg/5 mL) liquid (for example Advil or Motrin) may be given every 6 hours as needed for pain or fever.    Child’s Weight: Dose:  18 - 23 pounds:   75 mg (3.75 mL (3/4 Teaspoon))  24 - 35 pounds:   100 mg (5 mL (1Teaspoon)))    If Jennifer is outside these weight ranges, call your pediatrician's office for advice.    Most Recent Immunizations   Administered Date(s) Administered    COVID Moderna 6M-5Y 01/12/2023    DTaP/Hep B/IPV 01/12/2023    Hep A, ped/adol, 2 dose  07/19/2023    Hep B, Unspecified Formulation 2022    Hep B, adolescent or pediatric 2022    Hib (PRP-OMP) 10/19/2023    Influenza, quadrivalent, preserve-free 01/12/2023    MMR 07/19/2023    Pneumococcal Conjugate 13 Valent Vacc (Prevnar 13) 01/12/2023    Rotavirus - pentavalent 01/12/2023    Varicella 07/19/2023   Pended Date(s) Pended    DTaP(INFANRIX) 10/19/2023    Influenza, quadrivalent, preserve-free 10/19/2023    Pneumococcal Conjugate 20 Valent Vacc (Prevnar 20) 10/19/2023       If Jennifer develops any of the following reactions within 72 hours after an immunization, notify your pediatrician by calling the pediatric phone nurse:  A temperature of 105 degrees or above  More than 3 hours of continuous crying  A shrill, high-pitched cry  A pale, limp spell  A seizure or fainting spell.  In this case, you should call 911 or go immediately to the emergency room.      NEXT VISIT: 18 MONTHS OF AGE    Thank you for entrusting your care to [facility].      Also, check out “Children’s Health” on the [facility] Blog for updates on timely topics regarding children’s health!   "I've been having panic attacks because my jaw is protruding too far forward and there's nothing I can do."

## 2023-12-27 NOTE — ED PROVIDER NOTE - PATIENT PORTAL LINK FT
You can access the FollowMyHealth Patient Portal offered by [facility] by registering at the following website: [email or web address]. By joining [employer]’s FollowMyHealth portal, you will also be able to view your health information using other applications (apps) compatible with our system. You can access the FollowMyHealth Patient Portal offered by [facility] by registering at the following website: [email or web address]. By joining [employer]’s FollowMyHealth portal, you will also be able to view your health information using other applications (apps) compatible with our system.

## 2023-12-27 NOTE — BH SAFETY PLAN - SUICIDE PREVENTION LIFELINE PHONES
Suicide Prevention Lifeline Phone: [phone] ([phone]) Suicide Prevention Lifeline Phone: [phone]- TALK ([phone])

## 2023-12-27 NOTE — ED BEHAVIORAL HEALTH ASSESSMENT NOTE - NSSUICPROTFACT_PSY_ALL_CORE
Responsibility to children, family, or others/Identifies reasons for living/Supportive social network of family or friends/Fear of death or the actual act of killing self/Engaged in work or school/Positive therapeutic relationships Responsibility to children, family, or others/Identifies reasons for living/Supportive social network of family or friends/Fear of death or the actual act of killing self/Positive therapeutic relationships

## 2023-12-27 NOTE — ED BEHAVIORAL HEALTH ASSESSMENT NOTE - NS ED BHA PLAN TR BH CONTACTED FT
attempted to contact Anneliese Ragsdale [phone] attempted to contact Anneliese Ragsdale [phone]

## 2023-12-27 NOTE — ED PROVIDER NOTE - NSFOLLOWUPINSTRUCTIONS_ED_ALL_ED_FT
You were seen in the ED and evaluated by telepsychiatry.  You can take hydroxyzine (atarax) 25mg every 12 hours as needed for anxiety.  Please follow up at [facility] for your appointment (see below):    [facility]  [address] [address]  1/4/24 at 10:30am    Return to the ED for further concerns or suicidal or homicidal ideation. You were seen in the ED and evaluated by telepsychiatry.  You can take hydroxyzine (atarax) 25mg every 12 hours as needed for anxiety.  Please follow up at [facility] for your appointment (see below):    [facility]  [address] [address]  1/4/24 at 10:30am    Return to the ED for further concerns or suicidal or homicidal ideation.

## 2023-12-27 NOTE — ED PROVIDER NOTE - NS ED ATTENDING STATEMENT MOD
This was a shared visit with the ELIESER. I reviewed and verified the documentation and independently performed the documented:

## 2023-12-27 NOTE — ED BEHAVIORAL HEALTH ASSESSMENT NOTE - SUMMARY
24yo single male, domiciled with parents and siblings, enrolled in [employer] science program at [employer], not employed, has no dependents.  PMHx of an underbite s/p orthognathic surgery 4 months ago.  No formal past psychiatric history, no past inpatient or outpatient psychiatric treatment but has been engaged in outpatient psychotherapy since Oct 2023; no past IP psych admissions, no past suicide attempts, no past hx of violence or arrests, no hx of substance use.  Patient was BIB mother for panic attacks and SI related to dissatisfaction with a "botched jaw surgery."      On evaluation patient is experiencing symptoms of body dysmorphic disorder with significant thoughts that his jaw is protruding too far forward s/p surgery to correct an underbite.  Despite reassurance from family and his surgeon, patient continues to be preoccupied with his appearance and dissatisfied with surgical results; this has resulted in spending a significant amount of time looking at himself in pictures in the mirror, researching solutions online, researching the angles of his jaw, etc.  Patient has been anxious and having panic attacks for 1 week due to the above issues, and is also having symptoms of adjustment disorder with depressed mood.  Although he is having some thoughts of "wanting to disappear" he is not having any kay suicidal thoughts and denies any plan or intent to kill self.  He was able to safety plan with the writer.  He is still functioning at home and able to care for self. He has significant support from family, his two outpatient therapists; he also domiciled with family, has never attempted suicide, is sober, is engaged in school, and is future oriented and has a dedication to life and to family.  There are no psychiatric contraindications to discharge - he is not an acute danger to self or others.  patient should followup with [facility] on 1/4/24 at 10:30 for psychiatric treatment; he was agreeable to this referral.      Recommendations:  - no psychiatric contraindication to discharge   - f/u with [facility] at [address] [place] [id number]  1/4/24 at 10:30am  - Can give patient Rx for Hydroxyzine 25mg Q12hrs PRN for anxiety x 7 days 22yo single male, domiciled with parents and siblings, enrolled in [employer] science program at [facility] [employer] [facility] [facility], not employed, has no dependents.  PMHx of an underbite s/p orthognathic surgery 4 months ago.  No formal past psychiatric history, no past inpatient or outpatient psychiatric treatment but has been engaged in outpatient psychotherapy since Oct 2023; no past IP psych admissions, no past suicide attempts, no past hx of violence or arrests, no hx of substance use.  Patient was BIB mother for panic attacks and SI related to dissatisfaction with a "botched jaw surgery."      On evaluation patient is experiencing symptoms of body dysmorphic disorder with significant thoughts that his jaw is protruding too far forward s/p surgery to correct an underbite.  Despite reassurance from family and his surgeon, patient continues to be preoccupied with his appearance and dissatisfied with surgical results; this has resulted in spending a significant amount of time looking at himself in pictures in the mirror, researching solutions online, researching the angles of his jaw, etc.  Patient has been anxious and having panic attacks for 1 week due to the above issues, and is also having symptoms of adjustment disorder with depressed mood.  Although he is having some thoughts of "wanting to disappear" he is not having any kay suicidal thoughts and denies any plan or intent to kill self.  He was able to safety plan with the writer.  He is still functioning at home and able to care for self. He has significant support from family, his two outpatient therapists; he also domiciled with family, has never attempted suicide, is sober, is engaged in school, and is future oriented and has a dedication to life and to family.  There are no psychiatric contraindications to discharge - he is not an acute danger to self or others.  patient should followup with [facility] on 1/4/24 at 10:30 for psychiatric treatment; he was agreeable to this referral.      Recommendations:  - no psychiatric contraindication to discharge   - f/u with [facility] at [address] [place] [id number]  1/4/24 at 10:30am  - Can give patient Rx for Hydroxyzine 25mg Q12hrs PRN for anxiety x 7 days 22 yo single male, domiciled with parents and siblings in private residence, enrolled in [employer] science program at [employer], but currently on a leave of absence, not employed, has no dependents, PMHx significant for a self-reported underbite s/p orthognathic surgery 4 months ago, with no formal psychiatric history, no prior psych admissions, no prior suicide attempts, no past hx of violence or arrests, no hx of substance use, has been engaged in outpatient psychotherapy since Oct 2023, not on meds, who was BIB mother at pt's request for panic attacks and SI related to dissatisfaction with his reported "botched jaw surgery."       Given his history and exam, the pt's current presentation is concerning for body dysmorphic disorder with preoccupations about his jaw protruding too far forward s/p surgery to correct an underbite.  Despite reassurance from family and his surgeon, patient continues to be preoccupied with his appearance and reports dissatisfaction with the surgical results; this has resulted in spending a significant amount of time looking at himself in the mirror and at pictures of himself, researching solutions online, researching the angles of his jaw, etc.  Patient reports significant anxiety tied to his appearance and states he has been having panic attacks for 1 week due to the above issues, and also seems to be having symptoms of adjustment disorder with mixed features given his reported mood and anxiety symptoms. Although he is having some thoughts of "wanting to disappear" he is not having any kay suicidal thoughts with plan or intent to kill self, is able to safety plan, has been largely able to function, has significant support from family with whom he is domiciled, has never attempted suicide, denies active substance use, presents as future oriented and help-seeking, and cites numerous reasons to live. The pt's mother, who corroborated the history obtained by the pt, also denied any acute safety concerns.     Given the above risk/mitigating factors, the patient is at a chronically elevated risk of harm, but does not present as an imminent risk of harm to self or other at this time. As such, he does not meet criteria for involuntary psychiatric hospitalization and declined voluntary admission when offered. Instead, pt's risk was mitigated by engaging him in safety planning, scheduling him an appt with the [facility] for 1/4/24 at 10:30 AM, sending him a script for PRN Hydroxyzine for 1 week to bridge him until his appt, and instructing him to return to the ED for acute safety concerns in the future. In addition, pt's mother was instructed to secure all potentially dangerous items at home and to bring pt back to the ED in the future for acute safety concerns. No acute psychiatric contraindication to discharge.      Recommendations:  - no psychiatric contraindication to discharge   - f/u with [facility] at [address] [place] [id number]  1/4/24 at 10:30am  - Can give patient Rx for Hydroxyzine 25mg Q12hrs PRN for anxiety x 7 days 24 yo single male, domiciled with parents and siblings in private residence, enrolled in [employer] science program at [employer], but currently on a leave of absence, not employed, has no dependents, PMHx significant for a self-reported underbite s/p orthognathic surgery 4 months ago, with no formal psychiatric history, no prior psych admissions, no prior suicide attempts, no past hx of violence or arrests, no hx of substance use, has been engaged in outpatient psychotherapy since Oct 2023, not on meds, who was BIB mother at pt's request for panic attacks and SI related to dissatisfaction with his reported "botched jaw surgery."       Given his history and exam, the pt's current presentation is concerning for body dysmorphic disorder with preoccupations about his jaw protruding too far forward s/p surgery to correct an underbite.  Despite reassurance from family and his surgeon, patient continues to be preoccupied with his appearance and reports dissatisfaction with the surgical results; this has resulted in spending a significant amount of time looking at himself in the mirror and at pictures of himself, researching solutions online, researching the angles of his jaw, etc.  Patient reports significant anxiety tied to his appearance and states he has been having panic attacks for 1 week due to the above issues, and also seems to be having symptoms of adjustment disorder with mixed features given his reported mood and anxiety symptoms. Although he is having some thoughts of "wanting to disappear" he is not having any kay suicidal thoughts with plan or intent to kill self, is able to safety plan, has been largely able to function, has significant support from family with whom he is domiciled, has never attempted suicide, denies active substance use, presents as future oriented and help-seeking, and cites numerous reasons to live. The pt's mother, who corroborated the history obtained by the pt, also denied any acute safety concerns.     Given the above risk/mitigating factors, the patient is at a chronically elevated risk of harm, but does not present as an imminent risk of harm to self or other at this time. As such, he does not meet criteria for involuntary psychiatric hospitalization and declined voluntary admission when offered. Instead, pt's risk was mitigated by engaging him in safety planning, scheduling him an appt with the [facility] for 1/4/24 at 10:30 AM, sending him a script for PRN Hydroxyzine for 1 week to bridge him until his appt, and instructing him to return to the ED for acute safety concerns in the future. In addition, pt's mother was instructed to secure all potentially dangerous items at home and to bring pt back to the ED in the future for acute safety concerns. No acute psychiatric contraindication to discharge.      Recommendations:  - no psychiatric contraindication to discharge   - f/u with [facility] at [address] [place] [id number]  1/4/24 at 10:30am  - Can give patient Rx for Hydroxyzine 25mg Q12hrs PRN for anxiety x 7 days

## 2023-12-27 NOTE — ED BEHAVIORAL HEALTH ASSESSMENT NOTE - NSTXRELFACTOR_PSY_ALL_CORE
Non-compliant or not receiving treatment Non-compliant or not receiving treatment/Change in provider or treatment (i.e., medications, psychotherapy, milieu)

## 2023-12-27 NOTE — ED BEHAVIORAL HEALTH ASSESSMENT NOTE - NSBHATTESTBILLING_PSY_A_CORE
[id number] diagnostic evaluation with medical services [id number] diagnostic evaluation with medical services

## 2023-12-27 NOTE — ED BEHAVIORAL HEALTH ASSESSMENT NOTE - VIOLENCE PROTECTIVE FACTORS:
Residential stability/Sobriety/Engagement in treatment Residential stability/Relationship stability/Sobriety/Engagement in treatment

## 2023-12-27 NOTE — ED PROVIDER NOTE - CLINICAL SUMMARY MEDICAL DECISION MAKING FREE TEXT BOX
23yM p/w obsessive behavior after jaw surgery, now w/ suicidal ideation.  Pt calm, cooperative in the ED w/o violence, aggression or need for prn meds.  EKG sinus bradycardia w/o STEMI.  Labs reassuring.  Telepsych evaluated pt and recommend hydroxyzine prn, o/p f/u, return precautions.

## 2023-12-27 NOTE — ED BEHAVIORAL HEALTH ASSESSMENT NOTE - SAFETY PLAN ADDT'L DETAILS
Safety plan discussed with.../Education provided regarding environmental safety / lethal means restriction/Provision of National Suicide Prevention Lifeline 8-605-742-NBSL (1536) Safety plan discussed with.../Education provided regarding environmental safety / lethal means restriction/Provision of National Suicide Prevention Lifeline 5-888-690-MFOT (8850)

## 2023-12-28 VITALS
DIASTOLIC BLOOD PRESSURE: 78 MMHG | OXYGEN SATURATION: 99 % | HEART RATE: 84 BPM | SYSTOLIC BLOOD PRESSURE: 121 MMHG | RESPIRATION RATE: 18 BRPM

## 2023-12-28 RX ORDER — HYDROXYZINE HCL 10 MG
25 TABLET ORAL ONCE
Refills: 0 | Status: COMPLETED | OUTPATIENT
Start: 2023-12-28 | End: 2023-12-28

## 2023-12-28 RX ADMIN — Medication 25 MILLIGRAM(S): at 00:40

## 2024-01-04 ENCOUNTER — APPOINTMENT (OUTPATIENT)
Dept: PSYCHIATRY | Facility: CLINIC | Age: 24
End: 2024-01-04

## 2024-01-04 ENCOUNTER — OUTPATIENT (OUTPATIENT)
Dept: OUTPATIENT SERVICES | Facility: HOSPITAL | Age: 24
LOS: 1 days | End: 2024-01-04
Payer: COMMERCIAL

## 2024-01-04 DIAGNOSIS — F33.1 MAJOR DEPRESSIVE DISORDER, RECURRENT, MODERATE: ICD-10-CM

## 2024-01-04 DIAGNOSIS — F41.1 GENERALIZED ANXIETY DISORDER: ICD-10-CM

## 2024-01-04 PROCEDURE — 90791 PSYCH DIAGNOSTIC EVALUATION: CPT

## 2024-01-04 NOTE — RISK ASSESSMENT
[Clinical Records] : Clinical Records [Clinical Interview] : Clinical Interview [Yes] : 1. Passive Ideation: Have you wished you were dead or wished you could go to sleep and not wake up? Yes [In last 30 days] : in the last 30 days [No known suicide factors] : No known suicide factors [Depressed mood/Anhedonia] : depressed mood/anhedonia [Hopelessness or despair] : hopelessness or despair [Severe anxiety, agitation or panic] : severe anxiety, agitation or panic [Change in provider or treatment (i.e., medications, psychotherapy, milieu)] : change in provider or treatment (i.e., medications, psychotherapy, milieu) [Hopeless about or dissatisfied with provider or treatment] : hopeless about or dissatisfied with provider or treatment [Triggering events leading to humiliation, shame, and/or despair] : triggering events leading to humiliation, shame, and/or despair (e.g. loss of relationship, financial or health status) (real or anticipated) [Identifies reasons for living] : identifies reasons for living [Supportive social network of family or friends] : supportive social network of family or friends [Responsibility to children, family, or others] : responsibility to children, family, or others [Engaged in work or school] : engaged in work or school [FreeTextEntry3] : recent jaw surgery related to underbite that resulted in self-perceived change of physical appearance [None in the patient's lifetime] : None in the patient's lifetime [None Known] : none known [Yes, within past 3 months] : yes, within past 3 months [Residential stability] : residential stability [Relationship stability] : relationship stability [Employment stability] : employment stability [Affective stability] : affective stability [Sobriety] : sobriety [Engagement in treatment] : engagement in treatment [No] : no [FreeTextEntry5] : punched wall out of frustration within last 2 weeks

## 2024-01-04 NOTE — DISCUSSION/SUMMARY
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, has no dependents, PMHx significant for a self-reported underbite s/p orthognathic surgery 5 months ago, with no formal psychiatric history, no prior psych admissions, no prior suicide attempts, no past hx of violence or arrests, no hx of substance use, has been engaged in outpatient psychotherapy since Oct 2023, not on meds, who was BIB mother at pt's request for panic attacks and SI related to dissatisfaction with his reported "botched jaw surgery."  During intake, Mel states he "went for therapy consult" in 10/2023 and then started to go to therapy 11-12/2023 with 2 private therapists for 2 sessions each who did not know about the other as he was "looking for a good fit." When asked for further detail regarding if this was billed through insurance, he states "my mom handles it all." This writer explained this is not conducive to his care and if admitted to [facility], it is recommended he transfer all mental healthcare. He also saw a psych NP via [facility] last week and was prescribed Lexapro 5mg which he has been taking for 5 days. He was prescribed Hydroxyzine in ED but only took "2 tablets." Today during intake he is unsure if he wants to continue services with private clinicians or transfer care to [facility], therefore HIPAA forms were not completed. If he presents for psych eval as scheduled on 1/24/23 then the assigned therapists can complete HIPAA for previous records.    In ED on 12/27/23, patient reports that 4 months ago he had surgery to correct an underbite; he reports that it was medically necessary, but he also wanted it done for cosmetic reasons as he was very self-conscious about his underbite.  He reports that he's had chronic social anxiety and has always worried about his appearance, however over the past year it's been mainly related to his perceived hair loss and the aforementioned jaw issues. Patient reports that after the surgery he has been preoccupied with thoughts that his jaw now protrudes farther forward and he thinks about this all day, which is now leading to panic attacks over the past week with difficulty sleeping.  He reports spending a significant amount of time researching his jaw surgery, the angles of his jaw, reading online forums about correcting one's jaw, etc.  He also spends a significant amount of time looking in the mirror, taking pictures of his face, and ruminating about his appearance.  He reports insomnia over the past week due to anxiety, states he has been eating less, doesn't want to leave the house, and has been feeling low and hopeless for the last week. He admits to thoughts of "wanting to disappear" and "wanting to be someone else.", but consistently denies current or recent active SI with plan or intent or having any timeline for suicide.  He denies any past suicide attempts or doing anything recently to plan for one. Patient reports a strong fear of death and that he would never kill himself because he wouldn't want to hurt his family.      Today during intake Elymar reports 2 weeks ago multiple panic attacks regarding "this is my face forever" as he feels the surgeon pushed his jaw now too far forward and he would prefer it "2mm more back" "but I don't have function problems so at this point it's cosmetic." He reports constant, excessive worry about his jaw, his loss of hair, and his physical appearance worsening since the pandemic in 2020 but "I always knew I wasn't attractive." He also reports depressive sx described as hopelessness and depressed mood "my life isn't going to get any better, I avoid looking in the mirror and taking pictures," loss of interest in previously enjoyed activities, decreased appetite, and passive SI described as wishing he did not wake up in the morning but with no intent or plan. No hx of SA or self-harm, but notes he often tries to physically "push his jaw in." No IPP admissions.    Life goals, strengths, barriers, past successes: "find a purpose, doing something I enjoy for a living" "intuitive, smart" Notes hesitancy to "open up and share" with a therapist or "anyone in lifetime," fear of vulnerability "I don't want to burden other people" Also with current multiple providers due to feeling unsure of "what will work" Current providers but not sure of "right fit"  Recommendation:  [ ]      Medication Only [ ]     Individual therapy only [X]     Medication and Individual therapy [ ]     Group therapy  [Low acute suicide risk] : Low acute suicide risk [No] : No [Not clinically indicated] : Safety Plan completed/updated (for individuals at risk): Not clinically indicated

## 2024-01-04 NOTE — REASON FOR VISIT
[Number can be texted] : number can be texted [OK  to leave message] : OK  to leave message [FreeTextEntry3] : [email or web address] [FreeTextEntry5] : English [FreeTextEntry6] : Ammar [FreeTextEntry7] : he, him, his [[facility] Provider/Facility] : [facility] Provider/Facility [Patient] : Patient [Prior Medical Records] : Prior Medical Records [FreeTextEntry2] : therapy/medication management [FreeTextEntry1] : anxiety/panic, "body dysmorphia"

## 2024-01-04 NOTE — PHYSICAL EXAM
[None] : none [Cooperative] : cooperative [Euthymic] : euthymic [Anxious] : anxious [Full] : full [Clear] : clear [Linear/Goal Directed] : linear/goal directed [Average] : average [WNL] : within normal limits

## 2024-01-04 NOTE — PSYCHOSOCIAL ASSESSMENT
[All substances negative except as specified below] : All substances negative except as specified below [_____] : Duration: [unfilled]  [Grade: ____] : [unfilled] grade [Competitive and integrated employment] : Competitive and integrated employment [15 - 34 hours] : 15 - 34 hours [Dependent] : dependent [Earned income] : earned income [None] : none [Private residence (home, apartment, rooming house, hotel, motel, supported housing, supported Single Room Occupancy (SRO),] : Private residence (home, apartment, rooming house, hotel, motel, supported housing, supported Single Room Occupancy (SRO), permanent housing programs, transient housing programs, and shelter plus care housing) [Client's parents (biological, adoptive, stepparent)] : client's parents (biological, adoptive, stepparent) [Yes] : yes [Mother] : mother [Good] : good [Father] : father [Fair] : fair [Lives with Family Member] : lives with family member [No] : Patient has personal representation (legal guardian, representative payee, conservatorship)? No [FreeTextEntry4] : domiciled with parents, siblings (23 y/o M, 20 y/o F), maternal grandparents in private residence [FreeTextEntry7] : Hellen Shultz [FreeTextEntry1] : close relationship [FreeTextEntry8] : Reggie Shultz

## 2024-01-04 NOTE — HEALTH RISK ASSESSMENT
[Patient/Caregiver not ready to engage] : , patient/caregiver not ready to engage [AdvancecareDate] : 1/4/24 [Patient does not have a PAD] : Patient does not have a psychiatric advance directive [] : 1/4/24

## 2024-01-04 NOTE — PAST MEDICAL HISTORY
[FreeTextEntry1] : PMHx significant for a self-reported underbite s/p orthognathic surgery 5 months ago

## 2024-01-04 NOTE — SOCIAL HISTORY
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 What is your robi or belief? "Grew up [employer] but currently I'm still spiritual but not sure if I prescribe to a Sikh"  Do you consider yourself spiritual or Uatsdin? "Spiritual"  Is there something you believe in that gives meaning to your life? "Higher power and my mom"  I: Is it important in your life? "Yea"  What influence does it have on how you take care of yourself? "Concept of good and bad, try to be as good as possible"  How have your beliefs influenced your behavior during this illness? What role do your beliefs play in regaining your health? "None, not really"  C. Are you part of a spiritual or Uatsdin community? "Not part of it, just go to [employer] sometimes"  Is this of support to you and how? N/A  Is there a person or group of people you really love or who are really important to you? "Mom"  H. We have been discussing your belief and supports. What else gives you internal support? "Might be something in the future, hope"  What are your sources of hope, strength, comfort and peace? "Thought that people who are successfully had hard lives as well, struggle is okay"  What do you hold on to during difficult times? See above

## 2024-01-04 NOTE — FAMILY HISTORY
[FreeTextEntry1] : Family composition: raised by both parents, domiciled with parents, siblings (21 y/o M, 18 y/o F), maternal grandparents in private residence Family history and background: see above Family relationship: close relationship with mother "we hug everyday," my relationship with my dad is "strange and strained," "not that close" with brother and sister, close relatinoship with grandparents Pertinent Family Medical, MH and Substance Use History including Adult Child of Alcoholic and child of substance abuse status; history of cancer and heart disease Pt denied

## 2024-01-04 NOTE — HISTORY OF PRESENT ILLNESS
[Not Applicable] : Not applicable [FreeTextEntry1] : Session began at 11:10AM Session ended at 12:05AM Pt not eligible for health homes referral. All consents have been signed. Today during intake he is unsure if he wants to continue services with private clinicians or transfer care to [facility], therefore HIPAA forms were not completed. If he presents for psych eval as scheduled on 1/24/23 then the assigned therapists can complete HIPAA for previous records.   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, has no dependents, PMHx significant for a self-reported underbite s/p orthognathic surgery 5 months ago, with no formal psychiatric history, no prior psych admissions, no prior suicide attempts, no past hx of violence or arrests, no hx of substance use, has been engaged in outpatient psychotherapy since Oct 2023, not on meds, who was BIB mother at pt's request for panic attacks and SI related to dissatisfaction with his reported "botched jaw surgery."  During intake, Mel states he "went for therapy consult" in 10/2023 and then started to go to therapy 11-12/2023 with 2 private therapists for 2 sessions each who did not know about the other as he was "looking for a good fit." When asked for further detail regarding if this was billed through insurance, he states "my mom handles it all." This writer explained this is not conducive to his care and if admitted to [facility], it is recommended he transfer all mental healthcare. He also saw a psych NP via [facility] last week and was prescribed Lexapro 5mg which he has been taking for 5 days. He was prescribed Hydroxyzine in ED but only took "2 tablets." Today during intake he is unsure if he wants to continue services with private clinicians or transfer care to [facility], therefore HIPAA forms were not completed. If he presents for psych eval as scheduled on 1/24/23 then the assigned therapists can complete HIPAA for previous records.   In ED on 12/27/23, patient reports that 4 months ago he had surgery to correct an underbite; he reports that it was medically necessary, but he also wanted it done for cosmetic reasons as he was very self-conscious about his underbite.  He reports that he's had chronic social anxiety and has always worried about his appearance, however over the past year it's been mainly related to his perceived hair loss and the aforementioned jaw issues. Patient reports that after the surgery he has been preoccupied with thoughts that his jaw now protrudes farther forward and he thinks about this all day, which is now leading to panic attacks over the past week with difficulty sleeping.  He reports spending a significant amount of time researching his jaw surgery, the angles of his jaw, reading online forums about correcting one's jaw, etc.  He also spends a significant amount of time looking in the mirror, taking pictures of his face, and ruminating about his appearance.  He reports insomnia over the past week due to anxiety, states he has been eating less, doesn't want to leave the house, and has been feeling low and hopeless for the last week. He admits to thoughts of "wanting to disappear" and "wanting to be someone else.", but consistently denies current or recent active SI with plan or intent or having any timeline for suicide.  He denies any past suicide attempts or doing anything recently to plan for one. Patient reports a strong fear of death and that he would never kill himself because he wouldn't want to hurt his family.      Today during intake Mel reports 2 weeks ago multiple panic attacks regarding "this is my face forever" as he feels the surgeon pushed his jaw now too far forward and he would prefer it "2mm more back" "but I don't have function problems so at this point it's cosmetic." He reports constant, excessive worry about his jaw, his loss of hair, and his physical appearance worsening since the pandemic in 2020 but "I always knew I wasn't attractive." He also reports depressive sx described as hopelessness and depressed mood "my life isn't going to get any better, I avoid looking in the mirror and taking pictures," loss of interest in previously enjoyed activities, decreased appetite, and passive SI described as wishing he did not wake up in the morning but with no intent or plan. No hx of SA or self-harm, but notes he often tries to physically "push his jaw in." No IPP admissions.   [FreeTextEntry2] : no formal psychiatric history, no prior psych admissions  During intake, Mel states he "went for therapy consult" in 10/2023 and then started to go to therapy 11-12/2023 with 2 private therapists for 2 sessions each who did not know about the other as he was "looking for a good fit." When asked for further detail regarding if this was billed through insurance, he states "my mom handles it all." This writer explained this is not conducive to his care and if admitted to [facility], it is recommended he transfer all mental healthcare. [facility] also [facility] and was prescribed Lexapro 5mg which he has been taking for 5 days.  [FreeTextEntry3] : He also saw a psych NP via [facility] last week and was prescribed Lexapro 5mg which he has been taking for 5 days. He was prescribed Hydroxyzine in ED but only took "2 tablets."

## 2024-01-05 DIAGNOSIS — F41.1 GENERALIZED ANXIETY DISORDER: ICD-10-CM

## 2024-01-24 ENCOUNTER — OUTPATIENT (OUTPATIENT)
Dept: OUTPATIENT SERVICES | Facility: HOSPITAL | Age: 24
LOS: 1 days | End: 2024-01-24
Payer: COMMERCIAL

## 2024-01-24 ENCOUNTER — APPOINTMENT (OUTPATIENT)
Dept: PSYCHIATRY | Facility: CLINIC | Age: 24
End: 2024-01-24
Payer: COMMERCIAL

## 2024-01-24 DIAGNOSIS — F41.1 GENERALIZED ANXIETY DISORDER: ICD-10-CM

## 2024-01-24 PROCEDURE — 90792 PSYCH DIAG EVAL W/MED SRVCS: CPT | Mod: GC

## 2024-01-24 PROCEDURE — 99205 OFFICE O/P NEW HI 60 MIN: CPT

## 2024-01-25 DIAGNOSIS — F41.1 GENERALIZED ANXIETY DISORDER: ICD-10-CM

## 2024-01-29 NOTE — REASON FOR VISIT
[Other:___] : [unfilled] [[facility] Provider/Facility] : [facility] Provider/Facility [Patient] : Patient [Prior Medical Records] : Prior Medical Records [FreeTextEntry2] : Intake appointment. Assessment in the ED was Adjustment disorder with mixed anxiety and depressed mood, and Body dysmorphic disorder [FreeTextEntry1] : "I had jaw surgery last year and started to later have suicidal thoughts and panic attacks because I am worried about my jaw still protruding forward."

## 2024-01-29 NOTE — PHYSICAL EXAM
[Well groomed] : well groomed [Accelerated] : accelerated [Cooperative] : cooperative [Depressed] : depressed [Full] : full [Anxious] : anxious [Clear] : clear [Linear/Goal Directed] : linear/goal directed [None] : none [Somatic] : somatic [Average] : average [WNL] : within normal limits [de-identified] : Poor

## 2024-01-29 NOTE — FAMILY HISTORY
[FreeTextEntry1] : No formal family psychiatric history but patient suspects presence of depression and anxiety in multiple family members

## 2024-01-29 NOTE — DISCUSSION/SUMMARY
[FreeTextEntry1] : Mel Shultz is a 23-year-old [place]ian-American male, domiciled with his mother, father, and older brother in a private residence (has one younger sister who lives at college), single, non-caregiver, college student who completed 2 years at [employer] but currently on a leave of absence, no significant PMH, past surgical history of jaw surgery in July/August 2023 for an underbite, no formal past psychiatric history aside from seeing a psychiatric NP/PA in late December 2023/early January 2024 and started on Lexapro (5 mg for 1 week, 10 mg for 2 weeks; for depression/anxiety) and Seroquel (25 mg for sleep taken for only 1 week) and also seeing a therapist starting in October 2023, no history of inpatient psychiatric admissions, no history of suicide attempts or NSSIB, no past substance use history, no formal family psychiatric history but patient suspects presence of depression and anxiety in multiple family members, presents to the clinic following referral from the ED for an intake appointment.  On psychiatric evaluation, patient appears to have a preoccupation with a perceived defect in physical appearance (i.e., his jaw being protruded) that is not observable to others (e.g., clinicians, family members). Patient describes previous repetitive behaviors (e.g., mirror checking, taking pictures) in response to the appearance concerns. The preoccupation is causing clinically significant distress and impairment in his functioning. Patient appears to meet criteria for Body Dysmorphic Disorder. He has associated symptoms of depression, anxiety, and panic attacks. At this time, patient would benefit with continuing Lexapro for depression/anxiety/panic attacks. Discussed with patient that one option may be to increase Lexapro to 15 mg qdaily to more therapeutically target symptoms, but, in collaboration with the patient, treatment decision was made to continue Lexapro at his current dose of 10 mg qdaily with consideration to increase at the next visit. Patient would also benefit from continue psychotherapy. He states he will consider whether to continue with his current therapist or move psychotherapy care to this clinic. He is not acutely psychotic or manic. He is stable for outpatient level of care. Plan will be to follow up in 2 weeks

## 2024-01-29 NOTE — HISTORY OF PRESENT ILLNESS
[FreeTextEntry1] : Mel Shultz is a 23-year-old [place]-American male, domiciled with his mother, father, and older brother in a private residence (has one younger sister who lives at college), single, non-caregiver, college student who completed 2 years at [employer] [[email or web address]] but currently on a leave of absence, no significant PMH, past surgical history of jaw surgery in July/August 2023 for an underbite, no formal past psychiatric history aside from seeing a psychiatric NP/PA in late December 2023/early January 2024 and started on Lexapro (5 mg for 1 week, 10 mg for 2 weeks; for depression/anxiety) and Seroquel (25 mg for sleep taken for only 1 week) and also seeing a therapist starting in October 2023, no history of inpatient psychiatric admissions, no history of suicide attempts or NSSIB, no past substance use history, no formal family psychiatric history but patient suspects presence of depression and anxiety in multiple family members, presents to the clinic following referral from the ED for an intake appointment.   Patient states he had jaw surgery in July/August 2023 to correct an underbite. He states in November he shaved his head as part of Hajj and that is when he noticed his jaw looked protruded and started to have thoughts that the surgeon did not perform the jaw surgery correctly (e.g., states the surgeon typically performed this procedure for individuals with sleep apnea, which the patient does not have) and that his jaw is protruding 3 mm forward still. Patient states in November he had difficulty not thinking about the way he looked (despite reassurance from family members and even speaking to a family friend who is a psychiatrist) and started to develop suicidal ideation with no plan or intent that was occurring daily (e.g., he states he would never do anything to end his life because it would devastate his family), daily panic attacks that included increased anxiety, palpitations, trouble breathing, "a burst of emotion and rage" with associate actions of hitting his jaw to try to help ease the anxiety. States he also experienced depressed mood, difficulty sleeping, low appetite, low motivation to do things like work out and go out to be with people, and low energy. States in December at one point he took 300 pictures of himself to check how he looked, frequently would look in the mirror, and stopped watching tv and sports and going out to be around people because he did not want to see how other people had symmetrical jaws. Patient stated he also began to look up YouTube videos of the type of procedure he had and researched certain aspects of the procedure to try to ease his anxiety.  Patient states given these symptoms he had asked his mother for help and then they went to the ED. Patient states he also saw a psych PA/NP one time and in late December 2023/early January 2024 was started on Lexapro 5 mg for 1 week and increased to 10 mg for the last 2 weeks (along with Seroquel 25 mg qHS specifically for sleep but patient states he only took it for 1 week and started sleeping better so stopped the medication and has continued to sleep well). States since being on Lexapro he has not had a panic attack in the last 1-1.5 weeks, has slightly improved appetite, and feels less anxious. Patient states what is still giving him hope is that the Lexapro has helped him and will continue to help how he feels. States he still feels depressed and has intermittent SI with no plan or intent. Patient states he feels safe going home and understands he can call 911, contact [phone], or contact this writer if he has safety concerns.   He denies having symptoms consisent with OCD (recurrent or persistent thoughts with or without associated compulsive behavior such as washing or checking). Patient denies symptoms of jarod (discrete period of elevated mood or irritability, elevated energy, sleeplessness, talkativeness, racing thoughts), denies visual/auditory hallucinations, denies paranoid ideation. Denies any tobacco, alcohol, cannabis, psychedelic, or any other illicit substance use. Patient denies taking any medications currently or in the past (except for PRN hydroxyzine for anxiety only twice from the psych NP/PA) or taking any supplements aside from protein powder and vitamin D. Denies having any known allergies. Patient denies any formal family psychiatric history, but suspects depression in paternal side of the family. Denies knowing of any family history of suicide.  [FreeTextEntry2] : No formal past psychiatric history aside from seeing a psychiatric NP/PA in late December 2023/early January 2024 and started on Lexapro (5 mg for 1 week, 10 mg for 2 weeks; for depression/anxiety) and Seroquel (25 mg for sleep taken for only 1 week) and also seeing a therapist starting in October 2023, no history of inpatient psychiatric admissions, no history of suicide attempts or NSSIB, no past substance use history, no formal family psychiatric history but patient suspects presence of depression and anxiety in multiple family members [FreeTextEntry3] : Lexapro for 3 weeks (5 mg for 1 week, 10 mg for 2 weeks); prescribed for depression/anxiety/panic attacks. States has experienced some weight gain (4-5 lbs) and low libido but also states these symptoms may be secondary to mood and anxiety symptoms. Seroquel 25 mg for 1 week; prescribed for sleep. Patient stopped medication because started to sleep better. Hydroxyzine; prescribed PRN for anxiety. Patient states only tried twice.

## 2024-01-29 NOTE — PLAN
S/P hysterectomy [FreeTextEntry4] : - Continue Lexapro 10 mg qdaily for depression/anxiety/panic attacks - Psychotherapy: Patient continuing with current private therapist for now. Patient may switch to receive therapy services at the clinic. - Follow up in 2 weeks

## 2024-02-01 ENCOUNTER — NON-APPOINTMENT (OUTPATIENT)
Age: 24
End: 2024-02-01

## 2024-02-06 ENCOUNTER — APPOINTMENT (OUTPATIENT)
Dept: PSYCHIATRY | Facility: CLINIC | Age: 24
End: 2024-02-06
Payer: COMMERCIAL

## 2024-02-06 ENCOUNTER — OUTPATIENT (OUTPATIENT)
Dept: OUTPATIENT SERVICES | Facility: HOSPITAL | Age: 24
LOS: 1 days | End: 2024-02-06
Payer: COMMERCIAL

## 2024-02-06 DIAGNOSIS — F32.A DEPRESSION, UNSPECIFIED: ICD-10-CM

## 2024-02-06 DIAGNOSIS — F41.1 GENERALIZED ANXIETY DISORDER: ICD-10-CM

## 2024-02-06 PROCEDURE — ZZZZZ: CPT

## 2024-02-06 PROCEDURE — 99215 OFFICE O/P EST HI 40 MIN: CPT

## 2024-02-07 DIAGNOSIS — F32.A DEPRESSION, UNSPECIFIED: ICD-10-CM

## 2024-02-07 DIAGNOSIS — F41.1 GENERALIZED ANXIETY DISORDER: ICD-10-CM

## 2024-02-07 NOTE — PHYSICAL EXAM
[Well groomed] : well groomed [Cooperative] : cooperative [Depressed] : depressed [Anxious] : anxious [Full] : full [Clear] : clear [Linear/Goal Directed] : linear/goal directed [None] : none [Somatic] : somatic [Average] : average [WNL] : within normal limits [de-identified] : Poor specifically regarding somatic themes. Good insight regarding presence of psychiatric symptoms.

## 2024-02-07 NOTE — DISCUSSION/SUMMARY
[FreeTextEntry1] : Mel Shultz is a 23-year-old [place]-American male, domiciled with his mother, father, and older brother in a private residence (has one younger sister who lives at college), single, non-caregiver, college student who completed 2 years at [employer] but currently on a leave of absence, no significant PMH, past surgical history of jaw surgery in July/August 2023 for an underbite, no formal past psychiatric history aside from seeing a psychiatric NP/PA in late December 2023/early January 2024 and started on Lexapro (5 mg for 1 week, 10 mg for 2 weeks; for depression/anxiety) and Seroquel (25 mg for sleep taken for only 1 week) and also seeing a therapist starting in October 2023, no history of inpatient psychiatric admissions, no history of suicide attempts or NSSIB, no past substance use history, no formal family psychiatric history but patient suspects presence of depression and anxiety in multiple family members, presents to the clinic following referral from the ED for an intake appointment.  On psychiatric evaluation, patient appears to have a preoccupation with a perceived defect in physical appearance (i.e., his jaw being protruded) that is not observable to others (e.g., clinicians, family members). Patient describes previous repetitive behaviors (e.g., mirror checking, taking pictures) in response to the appearance concerns. The preoccupation is causing clinically significant distress and impairment in his functioning. Patient appears to meet criteria for Body Dysmorphic Disorder. He has associated symptoms of depression, anxiety, and panic attacks.   At this time, patient would benefit with continuing Lexapro for depression/anxiety/panic attacks. Discussed with patient that one option may be to increase Lexapro to 15 mg qdaily to more therapeutically target symptoms; patient agreeable. Patient would also benefit from continue psychotherapy. He states he will consider whether to continue with his current therapist or move psychotherapy care to this clinic. He is not acutely psychotic or manic. He is stable for outpatient level of care. Plan will be to follow up in 2 weeks

## 2024-02-07 NOTE — PLAN
[FreeTextEntry5] : - Increase Lexapro to 15 mg qdaily (from 10 mg qdaily) for depression/anxiety/panic attacks - Psychotherapy: Patient will decide whether to continue with private therapist or switch to therapy/med-management appointments at the clinic - Follow up in 2 weeks

## 2024-02-07 NOTE — HISTORY OF PRESENT ILLNESS
[FreeTextEntry1] : HPI: Mel Shultz is a 23-year-old [place] citizen-American male, domiciled with his mother, father, and older brother in a private residence (has one younger sister who lives at college), single, non-caregiver, college student who completed 2 years at [employer] but currently on a leave of absence, no significant PMH, past surgical history of jaw surgery in July/August 2023 for an underbite, no formal past psychiatric history aside from seeing a psychiatric NP/PA in late December 2023/early January 2024 and started on Lexapro (5 mg for 1 week, 10 mg for 2 weeks; for depression/anxiety) and Seroquel (25 mg for sleep taken for only 1 week) and also seeing a therapist starting in October 2023, no history of inpatient psychiatric admissions, no history of suicide attempts or NSSIB, no past substance use history, no formal family psychiatric history but patient suspects presence of depression and anxiety in multiple family members, presents to the clinic following referral from the ED for an intake appointment. [FreeTextEntry2] : No formal past psychiatric history aside from seeing a psychiatric NP/PA in late December 2023/early January 2024 and started on Lexapro (5 mg for 1 week, 10 mg for 2 weeks; for depression/anxiety) and Seroquel (25 mg for sleep taken for only 1 week) and also seeing a therapist starting in October 2023, no history of inpatient psychiatric admissions, no history of suicide attempts or NSSIB, no past substance use history, no formal family psychiatric history but patient suspects presence of depression and anxiety in multiple family members.   [FreeTextEntry3] : Lexapro for 3 weeks (5 mg for 1 week, 10 mg for 2 weeks); prescribed for depression/anxiety/panic attacks. States has experienced some weight gain (4-5 lbs) and low libido but also states these symptoms may be secondary to mood and anxiety symptoms. Seroquel 25 mg for 1 week; prescribed for sleep. Patient stopped medication because started to sleep better. Hydroxyzine; prescribed PRN for anxiety. Patient states only tried twice.

## 2024-02-07 NOTE — REASON FOR VISIT
[Patient with collateral] : Patient with collateral  [Mother] : mother [[id number]] : Hellen Shultz ([phone]) [FreeTextEntry1] : Medication management, follow-up/"I am still feeling depressed."

## 2024-02-20 ENCOUNTER — OUTPATIENT (OUTPATIENT)
Dept: OUTPATIENT SERVICES | Facility: HOSPITAL | Age: 24
LOS: 1 days | End: 2024-02-20
Payer: COMMERCIAL

## 2024-02-20 ENCOUNTER — APPOINTMENT (OUTPATIENT)
Dept: PSYCHIATRY | Facility: CLINIC | Age: 24
End: 2024-02-20
Payer: COMMERCIAL

## 2024-02-20 DIAGNOSIS — F32.A DEPRESSION, UNSPECIFIED: ICD-10-CM

## 2024-02-20 DIAGNOSIS — F41.1 GENERALIZED ANXIETY DISORDER: ICD-10-CM

## 2024-02-20 PROCEDURE — 99212 OFFICE O/P EST SF 10 MIN: CPT | Mod: 95

## 2024-02-20 PROCEDURE — ZZZZZ: CPT

## 2024-02-21 DIAGNOSIS — F32.A DEPRESSION, UNSPECIFIED: ICD-10-CM

## 2024-02-21 DIAGNOSIS — F41.1 GENERALIZED ANXIETY DISORDER: ICD-10-CM

## 2024-02-23 NOTE — HISTORY OF PRESENT ILLNESS
[FreeTextEntry1] : HPI: Mel Shultz is a 23-year-old [place]-American male, domiciled with his mother, father, and older brother in a private residence (has one younger sister who lives at college), single, non-caregiver, college student who completed 2 years at [employer] but currently on a leave of absence, no significant PMH, past surgical history of jaw surgery in July/August 2023 for an underbite, no formal past psychiatric history aside from seeing a psychiatric NP/PA in late December 2023/early January 2024 and started on Lexapro (5 mg for 1 week, 10 mg for 2 weeks; for depression/anxiety) and Seroquel (25 mg for sleep taken for only 1 week) and also seeing a therapist starting in October 2023, no history of inpatient psychiatric admissions, no history of suicide attempts or NSSIB, no past substance use history, no formal family psychiatric history but patient suspects presence of depression and anxiety in multiple family members, presents to the clinic following referral from the ED for an intake appointment. [FreeTextEntry2] : No formal past psychiatric history aside from seeing a psychiatric NP/PA in late December 2023/early January 2024 and started on Lexapro (5 mg for 1 week, 10 mg for 2 weeks; for depression/anxiety) and Seroquel (25 mg for sleep taken for only 1 week) and also seeing a therapist starting in October 2023, no history of inpatient psychiatric admissions, no history of suicide attempts or NSSIB, no past substance use history, no formal family psychiatric history but patient suspects presence of depression and anxiety in multiple family members.   [FreeTextEntry3] : Lexapro for 3 weeks (5 mg for 1 week, 10 mg for 2 weeks); prescribed for depression/anxiety/panic attacks. States has experienced some weight gain (4-5 lbs) and low libido but also states these symptoms may be secondary to mood and anxiety symptoms. Seroquel 25 mg for 1 week; prescribed for sleep. Patient stopped medication because started to sleep better. Hydroxyzine; prescribed PRN for anxiety. Patient states only tried twice.

## 2024-02-23 NOTE — REASON FOR VISIT
[Patient with collateral] : Patient with collateral  [FreeTextEntry1] : Medication management, follow-up

## 2024-02-23 NOTE — PHYSICAL EXAM
[Well groomed] : well groomed [Cooperative] : cooperative [Depressed] : depressed [Anxious] : anxious [Full] : full [Clear] : clear [Linear/Goal Directed] : linear/goal directed [None] : none [Somatic] : somatic [Average] : average [WNL] : within normal limits [de-identified] : Poor specifically regarding somatic themes. Good insight regarding presence of psychiatric symptoms.

## 2024-02-23 NOTE — DISCUSSION/SUMMARY
[FreeTextEntry1] : Mel Shultz is a 23-year-old [place]i-American male, domiciled with his mother, father, and older brother in a private residence (has one younger sister who lives at college), single, non-caregiver, college student who completed 2 years at [employer], [place] but currently on a leave of absence, no significant PMH, past surgical history of jaw surgery in July/August 2023 for an underbite, no formal past psychiatric history aside from seeing a psychiatric NP/PA in late December 2023/early January 2024 and started on Lexapro (5 mg for 1 week, 10 mg for 2 weeks; for depression/anxiety) and Seroquel (25 mg for sleep taken for only 1 week) and also seeing a therapist starting in October 2023, no history of inpatient psychiatric admissions, no history of suicide attempts or NSSIB, no past substance use history, no formal family psychiatric history but patient suspects presence of depression and anxiety in multiple family members, presents to the clinic following referral from the ED for an intake appointment.  On psychiatric evaluation, patient appears to have a preoccupation with a perceived defect in physical appearance (i.e., his jaw being protruded) that is not observable to others (e.g., clinicians, family members). Patient describes previous repetitive behaviors (e.g., mirror checking, taking pictures) in response to the appearance concerns. The preoccupation is causing clinically significant distress and impairment in his functioning. Patient appears to meet criteria for Body Dysmorphic Disorder. He has associated symptoms of depression, anxiety, and panic attacks.   At this time, patient would benefit with continuing Lexapro for depression/anxiety/panic attacks. Discussed with patient that one option may be to increase Lexapro to 20 mg qdaily to more therapeutically target symptom. Patient would also benefit from continue psychotherapy. He states he will continue with his current therapist. He is not acutely psychotic or manic. He is stable for outpatient level of care. Plan will be to follow up in 2 weeks.

## 2024-02-23 NOTE — PLAN
[FreeTextEntry5] : - May Increase Lexapro to 20 mg qdaily (from 15 mg qdaily) for depression/anxiety/panic attacks - Psychotherapy: Patient to continue with private therapist. - Follow up in 2 weeks

## 2024-03-05 ENCOUNTER — APPOINTMENT (OUTPATIENT)
Dept: PSYCHIATRY | Facility: CLINIC | Age: 24
End: 2024-03-05
Payer: COMMERCIAL

## 2024-03-05 ENCOUNTER — OUTPATIENT (OUTPATIENT)
Dept: OUTPATIENT SERVICES | Facility: HOSPITAL | Age: 24
LOS: 1 days | End: 2024-03-05
Payer: COMMERCIAL

## 2024-03-05 DIAGNOSIS — F32.A DEPRESSION, UNSPECIFIED: ICD-10-CM

## 2024-03-05 DIAGNOSIS — F41.1 GENERALIZED ANXIETY DISORDER: ICD-10-CM

## 2024-03-05 PROCEDURE — ZZZZZ: CPT

## 2024-03-05 PROCEDURE — 99215 OFFICE O/P EST HI 40 MIN: CPT

## 2024-03-05 NOTE — DISCUSSION/SUMMARY
[FreeTextEntry1] : Mel Shultz is a 23-year-old [place]uvian-American male, domiciled with his mother, father, and older brother in a private residence (has one younger sister who lives at college), single, non-caregiver, college student who completed 2 years at [employer] but currently on a leave of absence, no significant PMH, past surgical history of jaw surgery in July/August 2023 for an underbite, no formal past psychiatric history aside from seeing a psychiatric NP/PA in late December 2023/early January 2024 and started on Lexapro (5 mg for 1 week, 10 mg for 2 weeks; for depression/anxiety) and Seroquel (25 mg for sleep taken for only 1 week) and also seeing a therapist starting in October 2023, no history of inpatient psychiatric admissions, no history of suicide attempts or NSSIB, no past substance use history, no formal family psychiatric history but patient suspects presence of depression and anxiety in multiple family members, presents to the clinic following referral from the ED for an intake appointment.  On psychiatric evaluation, patient appears to have a preoccupation with a perceived defect in physical appearance (i.e., his jaw being protruded) that is not observable to others (e.g., clinicians, family members). Patient describes previous repetitive behaviors (e.g., mirror checking, taking pictures) in response to the appearance concerns. The preoccupation is causing clinically significant distress and impairment in his functioning. Patient appears to meet criteria for Body Dysmorphic Disorder. He has associated symptoms of depression, anxiety, and panic attacks.   At this time, patient would benefit with continuing Lexapro for depression/anxiety/panic attacks. Discussed with patient that one option may be to increase Lexapro to 20 mg qdaily to more therapeutically target symptom. Patient would also benefit from continue psychotherapy. He states he will continue with his current therapist. He is not acutely psychotic or manic. He is stable for outpatient level of care. Plan will be to follow up in 2 weeks.

## 2024-03-05 NOTE — PLAN
[FreeTextEntry5] : - Increase Lexapro to 20 mg qdaily (from 15 mg qdaily) for depression/anxiety/panic attacks - Psychotherapy: Patient to continue with private therapist at [facility]. - Follow up in 2 weeks

## 2024-03-05 NOTE — HISTORY OF PRESENT ILLNESS
[FreeTextEntry1] : Patient was seen in-person and evaluated. Chart was reviewed.  Patient continues to endorse low mood and ruminative thoughts thinking about his jaw. States he continues to isolate at home. Denies suicidal ideation. States has feelings of guilt that he should have told the surgeon to move his jaw more to the back prior to the procedure. States he is still taking Lexapro 15 mg qdaily and did not increase to 20 mg. Recommendation made to patient to increase to 20 mg to more therapeutically target depression and anxiety. Patient agreeable. States he is still following with the private therapist at [facility] but is more strongly considering switching therapy to the clinic here. [FreeTextEntry2] : No formal past psychiatric history aside from seeing a psychiatric NP/PA in late December 2023/early January 2024 and started on Lexapro (5 mg for 1 week, 10 mg for 2 weeks; for depression/anxiety) and Seroquel (25 mg for sleep taken for only 1 week) and also seeing a therapist starting in October 2023, no history of inpatient psychiatric admissions, no history of suicide attempts or NSSIB, no past substance use history, no formal family psychiatric history but patient suspects presence of depression and anxiety in multiple family members.   [FreeTextEntry3] : Lexapro for 3 weeks (5 mg for 1 week, 10 mg for 2 weeks); prescribed for depression/anxiety/panic attacks. States has experienced some weight gain (4-5 lbs) and low libido but also states these symptoms may be secondary to mood and anxiety symptoms. Seroquel 25 mg for 1 week; prescribed for sleep. Patient stopped medication because started to sleep better. Hydroxyzine; prescribed PRN for anxiety. Patient states only tried twice.

## 2024-03-05 NOTE — PHYSICAL EXAM
[Well groomed] : well groomed [Cooperative] : cooperative [Anxious] : anxious [Depressed] : depressed [Clear] : clear [Full] : full [Linear/Goal Directed] : linear/goal directed [Somatic] : somatic [None] : none [Average] : average [WNL] : within normal limits [de-identified] : Poor specifically regarding somatic themes. Good insight regarding presence of psychiatric symptoms.

## 2024-03-06 DIAGNOSIS — F41.1 GENERALIZED ANXIETY DISORDER: ICD-10-CM

## 2024-03-06 DIAGNOSIS — F32.A DEPRESSION, UNSPECIFIED: ICD-10-CM

## 2024-03-19 ENCOUNTER — APPOINTMENT (OUTPATIENT)
Dept: PSYCHIATRY | Facility: CLINIC | Age: 24
End: 2024-03-19

## 2024-03-19 ENCOUNTER — NON-APPOINTMENT (OUTPATIENT)
Age: 24
End: 2024-03-19

## 2024-04-02 ENCOUNTER — NON-APPOINTMENT (OUTPATIENT)
Age: 24
End: 2024-04-02

## 2024-04-03 ENCOUNTER — OUTPATIENT (OUTPATIENT)
Dept: OUTPATIENT SERVICES | Facility: HOSPITAL | Age: 24
LOS: 1 days | End: 2024-04-03
Payer: COMMERCIAL

## 2024-04-03 ENCOUNTER — APPOINTMENT (OUTPATIENT)
Dept: PSYCHIATRY | Facility: CLINIC | Age: 24
End: 2024-04-03
Payer: COMMERCIAL

## 2024-04-03 DIAGNOSIS — F41.9 ANXIETY DISORDER, UNSPECIFIED: ICD-10-CM

## 2024-04-03 DIAGNOSIS — F45.22 BODY DYSMORPHIC DISORDER: ICD-10-CM

## 2024-04-03 DIAGNOSIS — F32.A DEPRESSION, UNSPECIFIED: ICD-10-CM

## 2024-04-03 PROCEDURE — 99215 OFFICE O/P EST HI 40 MIN: CPT

## 2024-04-03 PROCEDURE — ZZZZZ: CPT

## 2024-04-03 RX ORDER — ESCITALOPRAM OXALATE 5 MG/1
5 TABLET ORAL DAILY
Qty: 90 | Refills: 0 | Status: DISCONTINUED | COMMUNITY
Start: 2024-02-06 | End: 2024-04-03

## 2024-04-03 RX ORDER — ESCITALOPRAM OXALATE 20 MG/1
20 TABLET ORAL DAILY
Qty: 90 | Refills: 1 | Status: ACTIVE | COMMUNITY
Start: 2024-01-24 | End: 1900-01-01

## 2024-04-03 NOTE — DISCUSSION/SUMMARY
[FreeTextEntry1] : Mel Shultz is a 23-year-old [place]-American male, domiciled with his mother, father, and older brother in a private residence (has one younger sister who lives at college), single, non-caregiver, college student who completed 2 years at [employer] but currently on a leave of absence, no significant PMH, past surgical history of jaw surgery in July/August 2023 for an underbite, no formal past psychiatric history aside from seeing a psychiatric NP/PA in late December 2023/early January 2024 and started on Lexapro (5 mg for 1 week, 10 mg for 2 weeks; for depression/anxiety) and Seroquel (25 mg for sleep taken for only 1 week) and also seeing a therapist starting in October 2023, no history of inpatient psychiatric admissions, no history of suicide attempts or NSSIB, no past substance use history, no formal family psychiatric history but patient suspects presence of depression and anxiety in multiple family members, presents to the clinic following referral from the ED for an intake appointment.  On psychiatric evaluation, patient appears to have a preoccupation with a perceived defect in physical appearance (i.e., his jaw being protruded) that is not observable to others (e.g., clinicians, family members). Patient describes previous repetitive behaviors (e.g., mirror checking, taking pictures) in response to the appearance concerns. The preoccupation is causing clinically significant distress and impairment in his functioning. Patient appears to meet criteria for Body Dysmorphic Disorder. He has associated symptoms of depression, anxiety, and panic attacks.   At this time, patient would benefit with continuing Lexapro for depression/anxiety/panic attacks. Discussed with patient that one option may be to increase Lexapro to 20 mg qdaily to more therapeutically target symptom. Patient would also benefit from continue psychotherapy. He states he will continue with his current therapist. He is not acutely psychotic or manic. He is stable for outpatient level of care. Plan will be to follow up in 2 weeks.

## 2024-04-03 NOTE — HISTORY OF PRESENT ILLNESS
[FreeTextEntry1] : HPI: Mel Shultz is a 23-year-old Dutch-American male, domiciled with his mother, father, and older brother in a private residence (has one younger sister who lives at college), single, non-caregiver, college student who completed 2 years at [employer] but currently on a leave of absence, no significant PMH, past surgical history of jaw surgery in July/August 2023 for an underbite, no formal past psychiatric history aside from seeing a psychiatric NP/PA in late December 2023/early January 2024 and started on Lexapro (5 mg for 1 week, 10 mg for 2 weeks; for depression/anxiety) and Seroquel (25 mg for sleep taken for only 1 week) and also seeing a therapist starting in October 2023, no history of inpatient psychiatric admissions, no history of suicide attempts or NSSIB, no past substance use history, no formal family psychiatric history but patient suspects presence of depression and anxiety in multiple family members, presents to the clinic following referral from the ED for an intake appointment. [FreeTextEntry2] : No formal past psychiatric history aside from seeing a psychiatric NP/PA in late December 2023/early January 2024 and started on Lexapro (5 mg for 1 week, 10 mg for 2 weeks; for depression/anxiety) and Seroquel (25 mg for sleep taken for only 1 week) and also seeing a therapist starting in October 2023, no history of inpatient psychiatric admissions, no history of suicide attempts or NSSIB, no past substance use history, no formal family psychiatric history but patient suspects presence of depression and anxiety in multiple family members.   [FreeTextEntry3] : Lexapro for 3 weeks (5 mg for 1 week, 10 mg for 2 weeks); prescribed for depression/anxiety/panic attacks. States has experienced some weight gain (4-5 lbs) and low libido but also states these symptoms may be secondary to mood and anxiety symptoms. Seroquel 25 mg for 1 week; prescribed for sleep. Patient stopped medication because started to sleep better. Hydroxyzine; prescribed PRN for anxiety. Patient states only tried twice.

## 2024-04-04 DIAGNOSIS — F41.9 ANXIETY DISORDER, UNSPECIFIED: ICD-10-CM

## 2024-04-04 DIAGNOSIS — F32.A DEPRESSION, UNSPECIFIED: ICD-10-CM

## 2024-04-04 DIAGNOSIS — F45.22 BODY DYSMORPHIC DISORDER: ICD-10-CM

## 2024-04-17 ENCOUNTER — APPOINTMENT (OUTPATIENT)
Dept: PSYCHIATRY | Facility: CLINIC | Age: 24
End: 2024-04-17

## 2024-05-21 ENCOUNTER — NON-APPOINTMENT (OUTPATIENT)
Age: 24
End: 2024-05-21

## 2024-05-21 NOTE — DISCUSSION/SUMMARY
[FreeTextEntry1] : Called patient (personal cell - [phone]) to check in. No answer. Left voicemail.

## 2024-06-11 ENCOUNTER — NON-APPOINTMENT (OUTPATIENT)
Age: 24
End: 2024-06-11

## 2024-06-24 ENCOUNTER — NON-APPOINTMENT (OUTPATIENT)
Age: 24
End: 2024-06-24

## 2024-06-24 DIAGNOSIS — F32.A DEPRESSION, UNSPECIFIED: ICD-10-CM

## 2024-06-24 DIAGNOSIS — F45.22 BODY DYSMORPHIC DISORDER: ICD-10-CM

## 2024-06-24 DIAGNOSIS — F41.9 ANXIETY DISORDER, UNSPECIFIED: ICD-10-CM

## 2024-07-12 ENCOUNTER — NON-APPOINTMENT (OUTPATIENT)
Age: 24
End: 2024-07-12

## 2024-07-12 ENCOUNTER — APPOINTMENT (OUTPATIENT)
Dept: PSYCHIATRY | Facility: CLINIC | Age: 24
End: 2024-07-12

## 2024-07-18 ENCOUNTER — APPOINTMENT (OUTPATIENT)
Dept: PSYCHIATRY | Facility: CLINIC | Age: 24
End: 2024-07-18
Payer: COMMERCIAL

## 2024-07-18 ENCOUNTER — OUTPATIENT (OUTPATIENT)
Dept: OUTPATIENT SERVICES | Facility: HOSPITAL | Age: 24
LOS: 1 days | End: 2024-07-18
Payer: COMMERCIAL

## 2024-07-18 DIAGNOSIS — F41.9 ANXIETY DISORDER, UNSPECIFIED: ICD-10-CM

## 2024-07-18 DIAGNOSIS — F45.22 BODY DYSMORPHIC DISORDER: ICD-10-CM

## 2024-07-18 DIAGNOSIS — F32.A DEPRESSION, UNSPECIFIED: ICD-10-CM

## 2024-07-18 PROCEDURE — ZZZZZ: CPT

## 2024-07-18 PROCEDURE — 99213 OFFICE O/P EST LOW 20 MIN: CPT

## 2024-07-19 DIAGNOSIS — F41.9 ANXIETY DISORDER, UNSPECIFIED: ICD-10-CM

## 2024-07-19 DIAGNOSIS — F45.22 BODY DYSMORPHIC DISORDER: ICD-10-CM

## 2024-08-12 ENCOUNTER — APPOINTMENT (OUTPATIENT)
Dept: PSYCHIATRY | Facility: CLINIC | Age: 24
End: 2024-08-12
Payer: COMMERCIAL

## 2024-08-12 DIAGNOSIS — F45.22 BODY DYSMORPHIC DISORDER: ICD-10-CM

## 2024-08-12 DIAGNOSIS — F32.A DEPRESSION, UNSPECIFIED: ICD-10-CM

## 2024-08-12 DIAGNOSIS — F41.9 ANXIETY DISORDER, UNSPECIFIED: ICD-10-CM

## 2024-08-12 PROCEDURE — ZZZZZ: CPT

## 2024-08-12 NOTE — PLAN
[Yes. details: ___] : Yes, [unfilled] [Medication education provided] : Medication education provided. [FreeTextEntry5] : - c/w Lexapro 20 mg PO qdaily depression/anxiety/panic attacks ---monitor for pt reported decreased libido and 30lb weight gain in 9 months --- consider augmenting with Wellbutrin in no notable decrease in obsessive symptoms at next appointment - c/w Psychotherapy with private therapist at [facility] - Health maintenance labs to be ordered at next appointment (a1c, lipids, tsh) - Follow up in 5 weeks (Mon Sept 16th 6PM)

## 2024-08-12 NOTE — DISCUSSION/SUMMARY
[FreeTextEntry1] : Patient is a 22yo male, single, non-caregiver, domiciled with family in a private residence (mother, father, older brother, grandparents, younger sister at college sometimes), college student who completed 2 years at [employer] but currently on a leave of absence, no significant PMHx, PSHx of jaw surgery in July/August 2023 for an underbite, no formal PPHx aside from therapist at [facility] starting in October 2023 and psychiatric NP/PA in late December 2023/early January 2024 (started on Lexapro), no reported history of inpatient psychiatric admissions, no reported history of suicide attempts or NSSIB, no reported past substance use history, no reported formal family psychiatric history, who is being followed in the outpatient behavior health clinic for treatment of body dysmorphia, depression, and anxiety.  On initial psychiatric evaluation, patient appears to have a preoccupation with a perceived defect in physical appearance (i.e., his jaw being protruded) that is not observable to others (e.g., clinicians, family members). Patient describes previous repetitive behaviors (e.g., mirror checking, taking pictures) in response to the appearance concerns. The preoccupation is causing clinically significant distress and impairment in his functioning. Patient appears to meet criteria for Body Dysmorphic Disorder. He has associated symptoms of depression, anxiety, and panic attacks.   At this time, patient would benefit with continuing Lexapro for depression/anxiety/panic attacks. Patient is describing some efficacy with Lexapro including no panic attacks for months, but still rates his mood as low and obsessive thoughts about his jaw continuing to take up over 90% of his "brain effort" (used to be 100%) and continues to be very functionally impairing. Discussed with patient that since he was only increased to 20mg recently the plan would be to examine the efficacy at the next appointment if we do not see improvement, consider alternative medications, patient was agreeable. Patient would also benefit from continuing psychotherapy which he plans to do with [facility]. He is not acutely psychotic or manic. He is stable for outpatient level of care. Plan will be to follow up in 4 weeks.

## 2024-08-12 NOTE — PHYSICAL EXAM
[Well groomed] : well groomed [Cooperative] : cooperative [Depressed] : depressed [Anxious] : anxious [Full] : full [Clear] : clear [Linear/Goal Directed] : linear/goal directed [None] : none [Somatic] : somatic [Average] : average [WNL] : within normal limits [FreeTextEntry1] : no gross physical deformities noted [de-identified] : Poor specifically regarding somatic themes. Good insight regarding presence of psychiatric symptoms.

## 2024-08-12 NOTE — HISTORY OF PRESENT ILLNESS
[FreeTextEntry1] : HPI: Pt is a 23-year-old Samoan-American male, domiciled with his mother, father, and older brother in a private residence (has one younger sister who lives at college), single, non-caregiver, college student who completed 2 years at [employer] but currently on a leave of absence, no significant PMH, past surgical history of jaw surgery in July/August 2023 for an underbite, no formal past psychiatric history aside from seeing a psychiatric NP/PA in late December 2023/early January 2024 and started on Lexapro (5 mg for 1 week, 10 mg for 2 weeks; for depression/anxiety) and Seroquel (25 mg for sleep taken for only 1 week) and also seeing a therapist starting in October 2023, no history of inpatient psychiatric admissions, no history of suicide attempts or NSSIB, no past substance use history, no formal family psychiatric history but patient suspects presence of depression and anxiety in multiple family members, presents to the clinic following referral from the ED for an intake appointment. [FreeTextEntry2] : No formal past psychiatric history aside from seeing a psychiatric NP/PA in late December 2023/early January 2024 and started on Lexapro (5 mg for 1 week, 10 mg for 2 weeks; for depression/anxiety) and Seroquel (25 mg for sleep taken for only 1 week) and also seeing a therapist starting in October 2023, no history of inpatient psychiatric admissions, no history of suicide attempts or NSSIB, no past substance use history, no formal family psychiatric history but patient suspects presence of depression and anxiety in multiple family members.   [FreeTextEntry3] : - Lexapro for 3 weeks (5 mg for 1 week, 10 mg for 2 weeks, 15mg for 5months, 20mg currently for 1 week); prescribed for depression/anxiety/panic attacks. States has experienced some weight gain (30 lbs in 9 months) and low libido but also states these symptoms have confounding factors  - Seroquel 25 mg for 1 week; prescribed for sleep. Patient stopped medication because started to sleep better. - Hydroxyzine; prescribed PRN for anxiety. Patient states only tried twice.

## 2024-09-16 ENCOUNTER — APPOINTMENT (OUTPATIENT)
Dept: PSYCHIATRY | Facility: CLINIC | Age: 24
End: 2024-09-16

## 2024-11-21 ENCOUNTER — APPOINTMENT (OUTPATIENT)
Dept: PSYCHIATRY | Facility: CLINIC | Age: 24
End: 2024-11-21
Payer: COMMERCIAL

## 2024-11-21 ENCOUNTER — OUTPATIENT (OUTPATIENT)
Dept: OUTPATIENT SERVICES | Facility: HOSPITAL | Age: 24
LOS: 1 days | End: 2024-11-21
Payer: COMMERCIAL

## 2024-11-21 DIAGNOSIS — F32.A DEPRESSION, UNSPECIFIED: ICD-10-CM

## 2024-11-21 DIAGNOSIS — F45.22 BODY DYSMORPHIC DISORDER: ICD-10-CM

## 2024-11-21 DIAGNOSIS — F41.9 ANXIETY DISORDER, UNSPECIFIED: ICD-10-CM

## 2024-11-21 PROCEDURE — ZZZZZ: CPT

## 2024-11-21 PROCEDURE — 99213 OFFICE O/P EST LOW 20 MIN: CPT

## 2024-11-21 RX ORDER — BUPROPION HYDROCHLORIDE 100 MG/1
100 TABLET, FILM COATED ORAL
Qty: 45 | Refills: 0 | Status: ACTIVE | COMMUNITY
Start: 2024-11-21 | End: 1900-01-01

## 2024-11-21 RX ORDER — ESCITALOPRAM OXALATE 10 MG/1
10 TABLET ORAL
Qty: 30 | Refills: 0 | Status: ACTIVE | COMMUNITY
Start: 2024-11-21 | End: 1900-01-01

## 2024-11-22 DIAGNOSIS — F45.22 BODY DYSMORPHIC DISORDER: ICD-10-CM

## 2024-11-22 DIAGNOSIS — F41.9 ANXIETY DISORDER, UNSPECIFIED: ICD-10-CM

## 2024-12-19 ENCOUNTER — APPOINTMENT (OUTPATIENT)
Dept: PSYCHIATRY | Facility: CLINIC | Age: 24
End: 2024-12-19
Payer: COMMERCIAL

## 2024-12-19 ENCOUNTER — OUTPATIENT (OUTPATIENT)
Dept: OUTPATIENT SERVICES | Facility: HOSPITAL | Age: 24
LOS: 1 days | End: 2024-12-19
Payer: COMMERCIAL

## 2024-12-19 DIAGNOSIS — F32.A DEPRESSION, UNSPECIFIED: ICD-10-CM

## 2024-12-19 DIAGNOSIS — F41.9 ANXIETY DISORDER, UNSPECIFIED: ICD-10-CM

## 2024-12-19 DIAGNOSIS — F45.22 BODY DYSMORPHIC DISORDER: ICD-10-CM

## 2024-12-19 PROCEDURE — ZZZZZ: CPT

## 2024-12-19 PROCEDURE — 99213 OFFICE O/P EST LOW 20 MIN: CPT

## 2024-12-20 DIAGNOSIS — F45.22 BODY DYSMORPHIC DISORDER: ICD-10-CM

## 2024-12-20 DIAGNOSIS — F32.A DEPRESSION, UNSPECIFIED: ICD-10-CM

## 2025-02-21 ENCOUNTER — OUTPATIENT (OUTPATIENT)
Dept: OUTPATIENT SERVICES | Facility: HOSPITAL | Age: 25
LOS: 1 days | End: 2025-02-21

## 2025-02-21 DIAGNOSIS — Z00.00 ENCOUNTER FOR GENERAL ADULT MEDICAL EXAMINATION WITHOUT ABNORMAL FINDINGS: ICD-10-CM

## 2025-02-22 DIAGNOSIS — Z00.00 ENCOUNTER FOR GENERAL ADULT MEDICAL EXAMINATION WITHOUT ABNORMAL FINDINGS: ICD-10-CM

## 2025-02-28 ENCOUNTER — APPOINTMENT (OUTPATIENT)
Dept: PSYCHIATRY | Facility: CLINIC | Age: 25
End: 2025-02-28

## 2025-03-07 ENCOUNTER — APPOINTMENT (OUTPATIENT)
Dept: PSYCHIATRY | Facility: CLINIC | Age: 25
End: 2025-03-07
Payer: COMMERCIAL

## 2025-03-07 ENCOUNTER — OUTPATIENT (OUTPATIENT)
Dept: OUTPATIENT SERVICES | Facility: HOSPITAL | Age: 25
LOS: 1 days | End: 2025-03-07
Payer: COMMERCIAL

## 2025-03-07 DIAGNOSIS — F45.22 BODY DYSMORPHIC DISORDER: ICD-10-CM

## 2025-03-07 DIAGNOSIS — F32.A DEPRESSION, UNSPECIFIED: ICD-10-CM

## 2025-03-07 DIAGNOSIS — F41.9 ANXIETY DISORDER, UNSPECIFIED: ICD-10-CM

## 2025-03-07 PROCEDURE — ZZZZZ: CPT

## 2025-03-07 PROCEDURE — 99213 OFFICE O/P EST LOW 20 MIN: CPT

## 2025-03-07 RX ORDER — BUPROPION HYDROCHLORIDE 300 MG/1
300 TABLET, EXTENDED RELEASE ORAL DAILY
Qty: 30 | Refills: 0 | Status: ACTIVE | COMMUNITY
Start: 2025-03-07 | End: 1900-01-01

## 2025-03-08 DIAGNOSIS — F45.22 BODY DYSMORPHIC DISORDER: ICD-10-CM

## 2025-03-18 ENCOUNTER — OUTPATIENT (OUTPATIENT)
Dept: OUTPATIENT SERVICES | Facility: HOSPITAL | Age: 25
LOS: 1 days | End: 2025-03-18

## 2025-03-18 DIAGNOSIS — Z00.00 ENCOUNTER FOR GENERAL ADULT MEDICAL EXAMINATION WITHOUT ABNORMAL FINDINGS: ICD-10-CM

## 2025-03-19 DIAGNOSIS — Z00.00 ENCOUNTER FOR GENERAL ADULT MEDICAL EXAMINATION WITHOUT ABNORMAL FINDINGS: ICD-10-CM

## 2025-04-04 ENCOUNTER — OUTPATIENT (OUTPATIENT)
Dept: OUTPATIENT SERVICES | Facility: HOSPITAL | Age: 25
LOS: 1 days | End: 2025-04-04
Payer: COMMERCIAL

## 2025-04-04 ENCOUNTER — APPOINTMENT (OUTPATIENT)
Dept: PSYCHIATRY | Facility: CLINIC | Age: 25
End: 2025-04-04
Payer: COMMERCIAL

## 2025-04-04 DIAGNOSIS — F32.A DEPRESSION, UNSPECIFIED: ICD-10-CM

## 2025-04-04 DIAGNOSIS — F45.22 BODY DYSMORPHIC DISORDER: ICD-10-CM

## 2025-04-04 DIAGNOSIS — F41.9 ANXIETY DISORDER, UNSPECIFIED: ICD-10-CM

## 2025-04-04 PROCEDURE — 99214 OFFICE O/P EST MOD 30 MIN: CPT

## 2025-04-04 PROCEDURE — ZZZZZ: CPT

## 2025-04-04 RX ORDER — PROPRANOLOL HYDROCHLORIDE 10 MG/1
10 TABLET ORAL
Qty: 7 | Refills: 0 | Status: ACTIVE | COMMUNITY
Start: 2025-04-04 | End: 1900-01-01

## 2025-04-05 DIAGNOSIS — F32.A DEPRESSION, UNSPECIFIED: ICD-10-CM

## 2025-05-02 ENCOUNTER — APPOINTMENT (OUTPATIENT)
Dept: PSYCHIATRY | Facility: CLINIC | Age: 25
End: 2025-05-02

## 2025-05-12 ENCOUNTER — APPOINTMENT (OUTPATIENT)
Dept: PSYCHIATRY | Facility: CLINIC | Age: 25
End: 2025-05-12
Payer: COMMERCIAL

## 2025-05-12 ENCOUNTER — OUTPATIENT (OUTPATIENT)
Dept: OUTPATIENT SERVICES | Facility: HOSPITAL | Age: 25
LOS: 1 days | End: 2025-05-12
Payer: COMMERCIAL

## 2025-05-12 DIAGNOSIS — F45.22 BODY DYSMORPHIC DISORDER: ICD-10-CM

## 2025-05-12 DIAGNOSIS — F32.A DEPRESSION, UNSPECIFIED: ICD-10-CM

## 2025-05-12 DIAGNOSIS — F41.9 ANXIETY DISORDER, UNSPECIFIED: ICD-10-CM

## 2025-05-12 PROCEDURE — 99214 OFFICE O/P EST MOD 30 MIN: CPT

## 2025-05-12 PROCEDURE — ZZZZZ: CPT

## 2025-05-13 DIAGNOSIS — F32.A DEPRESSION, UNSPECIFIED: ICD-10-CM

## 2025-05-13 DIAGNOSIS — F41.9 ANXIETY DISORDER, UNSPECIFIED: ICD-10-CM

## 2025-05-13 DIAGNOSIS — F45.22 BODY DYSMORPHIC DISORDER: ICD-10-CM

## 2025-07-24 ENCOUNTER — APPOINTMENT (OUTPATIENT)
Dept: PSYCHIATRY | Facility: CLINIC | Age: 25
End: 2025-07-24
Payer: COMMERCIAL

## 2025-07-24 ENCOUNTER — OUTPATIENT (OUTPATIENT)
Dept: OUTPATIENT SERVICES | Facility: HOSPITAL | Age: 25
LOS: 1 days | End: 2025-07-24
Payer: COMMERCIAL

## 2025-07-24 DIAGNOSIS — F41.9 ANXIETY DISORDER, UNSPECIFIED: ICD-10-CM

## 2025-07-24 DIAGNOSIS — F45.22 BODY DYSMORPHIC DISORDER: ICD-10-CM

## 2025-07-24 DIAGNOSIS — F32.A DEPRESSION, UNSPECIFIED: ICD-10-CM

## 2025-07-24 PROCEDURE — ZZZZZ: CPT

## 2025-07-24 PROCEDURE — 99213 OFFICE O/P EST LOW 20 MIN: CPT

## 2025-07-25 DIAGNOSIS — F32.A DEPRESSION, UNSPECIFIED: ICD-10-CM

## 2025-07-25 DIAGNOSIS — F45.22 BODY DYSMORPHIC DISORDER: ICD-10-CM

## 2025-08-21 ENCOUNTER — APPOINTMENT (OUTPATIENT)
Dept: PSYCHIATRY | Facility: CLINIC | Age: 25
End: 2025-08-21

## 2025-08-26 ENCOUNTER — APPOINTMENT (OUTPATIENT)
Dept: PSYCHIATRY | Facility: CLINIC | Age: 25
End: 2025-08-26
Payer: COMMERCIAL

## 2025-08-26 ENCOUNTER — OUTPATIENT (OUTPATIENT)
Dept: OUTPATIENT SERVICES | Facility: HOSPITAL | Age: 25
LOS: 1 days | End: 2025-08-26
Payer: COMMERCIAL

## 2025-08-26 DIAGNOSIS — F45.22 BODY DYSMORPHIC DISORDER: ICD-10-CM

## 2025-08-26 DIAGNOSIS — F33.1 MAJOR DEPRESSIVE DISORDER, RECURRENT, MODERATE: ICD-10-CM

## 2025-08-26 DIAGNOSIS — F41.9 ANXIETY DISORDER, UNSPECIFIED: ICD-10-CM

## 2025-08-26 DIAGNOSIS — F32.A DEPRESSION, UNSPECIFIED: ICD-10-CM

## 2025-08-26 PROCEDURE — 99214 OFFICE O/P EST MOD 30 MIN: CPT

## 2025-08-26 PROCEDURE — ZZZZZ: CPT

## 2025-08-26 RX ORDER — BUPROPION HYDROCHLORIDE 450 MG/1
450 TABLET, FILM COATED, EXTENDED RELEASE ORAL
Qty: 30 | Refills: 0 | Status: ACTIVE | COMMUNITY
Start: 2025-08-26 | End: 1900-01-01

## 2025-08-27 DIAGNOSIS — F32.A DEPRESSION, UNSPECIFIED: ICD-10-CM

## 2025-08-27 DIAGNOSIS — F45.22 BODY DYSMORPHIC DISORDER: ICD-10-CM
